# Patient Record
Sex: MALE | Race: WHITE | Employment: FULL TIME | URBAN - NONMETROPOLITAN AREA
[De-identification: names, ages, dates, MRNs, and addresses within clinical notes are randomized per-mention and may not be internally consistent; named-entity substitution may affect disease eponyms.]

---

## 2018-04-07 ENCOUNTER — HOSPITAL ENCOUNTER (EMERGENCY)
Age: 43
Discharge: HOME | End: 2018-04-07
Payer: COMMERCIAL

## 2018-04-07 VITALS
RESPIRATION RATE: 16 BRPM | SYSTOLIC BLOOD PRESSURE: 120 MMHG | WEIGHT: 205 LBS | DIASTOLIC BLOOD PRESSURE: 79 MMHG | HEART RATE: 66 BPM | HEIGHT: 74 IN | BODY MASS INDEX: 26.31 KG/M2 | OXYGEN SATURATION: 97 % | TEMPERATURE: 97.6 F

## 2018-04-07 DIAGNOSIS — J02.9 VIRAL PHARYNGITIS: Primary | ICD-10-CM

## 2018-04-07 LAB
GROUP A STREP CULTURE, REFLEX: NEGATIVE
REFLEX THROAT C + S: NORMAL

## 2018-04-07 PROCEDURE — 87070 CULTURE OTHR SPECIMN AEROBIC: CPT

## 2018-04-07 PROCEDURE — 99213 OFFICE O/P EST LOW 20 MIN: CPT | Performed by: NURSE PRACTITIONER

## 2018-04-07 PROCEDURE — 99214 OFFICE O/P EST MOD 30 MIN: CPT

## 2018-04-07 RX ORDER — IBUPROFEN 400 MG/1
400 TABLET ORAL EVERY 6 HOURS PRN
COMMUNITY
End: 2021-12-09

## 2018-04-07 ASSESSMENT — ENCOUNTER SYMPTOMS
COUGH: 1
DIARRHEA: 0
SHORTNESS OF BREATH: 0
NAUSEA: 0
SINUS PRESSURE: 1
CHEST TIGHTNESS: 0
SORE THROAT: 1
VOMITING: 0
SINUS PAIN: 1
RHINORRHEA: 1

## 2018-04-07 ASSESSMENT — PAIN DESCRIPTION - PAIN TYPE: TYPE: ACUTE PAIN

## 2018-04-07 ASSESSMENT — PAIN DESCRIPTION - FREQUENCY: FREQUENCY: CONTINUOUS

## 2018-04-07 ASSESSMENT — PAIN DESCRIPTION - DESCRIPTORS: DESCRIPTORS: ACHING;BURNING

## 2018-04-07 ASSESSMENT — PAIN DESCRIPTION - LOCATION: LOCATION: THROAT

## 2018-04-07 ASSESSMENT — PAIN SCALES - GENERAL: PAINLEVEL_OUTOF10: 3

## 2018-04-09 LAB — THROAT/NOSE CULTURE: NORMAL

## 2018-12-05 ENCOUNTER — APPOINTMENT (OUTPATIENT)
Dept: ULTRASOUND IMAGING | Age: 43
End: 2018-12-05
Payer: COMMERCIAL

## 2018-12-05 ENCOUNTER — HOSPITAL ENCOUNTER (EMERGENCY)
Age: 43
Discharge: HOME | End: 2018-12-05
Attending: EMERGENCY MEDICINE
Payer: COMMERCIAL

## 2018-12-05 VITALS
RESPIRATION RATE: 16 BRPM | BODY MASS INDEX: 26.31 KG/M2 | TEMPERATURE: 97.9 F | HEART RATE: 70 BPM | DIASTOLIC BLOOD PRESSURE: 87 MMHG | OXYGEN SATURATION: 98 % | WEIGHT: 205 LBS | SYSTOLIC BLOOD PRESSURE: 126 MMHG | HEIGHT: 74 IN

## 2018-12-05 DIAGNOSIS — R10.10 UPPER ABDOMINAL PAIN: ICD-10-CM

## 2018-12-05 DIAGNOSIS — R19.7 NAUSEA VOMITING AND DIARRHEA: Primary | ICD-10-CM

## 2018-12-05 DIAGNOSIS — K52.9 GASTROENTERITIS: ICD-10-CM

## 2018-12-05 DIAGNOSIS — R11.2 NAUSEA VOMITING AND DIARRHEA: Primary | ICD-10-CM

## 2018-12-05 LAB
ALBUMIN SERPL-MCNC: 4.1 G/DL (ref 3.5–5.1)
ALP BLD-CCNC: 73 U/L (ref 38–126)
ALT SERPL-CCNC: 33 U/L (ref 11–66)
AMYLASE: 49 U/L (ref 20–104)
ANION GAP SERPL CALCULATED.3IONS-SCNC: 9 MEQ/L (ref 8–16)
AST SERPL-CCNC: 28 U/L (ref 5–40)
BASOPHILS # BLD: 0.2 %
BASOPHILS ABSOLUTE: 0 THOU/MM3 (ref 0–0.1)
BILIRUB SERPL-MCNC: 0.4 MG/DL (ref 0.3–1.2)
BILIRUBIN DIRECT: < 0.2 MG/DL (ref 0–0.3)
BUN BLDV-MCNC: 17 MG/DL (ref 7–22)
CALCIUM SERPL-MCNC: 8.6 MG/DL (ref 8.5–10.5)
CHLORIDE BLD-SCNC: 98 MEQ/L (ref 98–111)
CO2: 27 MEQ/L (ref 23–33)
CREAT SERPL-MCNC: 0.8 MG/DL (ref 0.4–1.2)
EOSINOPHIL # BLD: 0.9 %
EOSINOPHILS ABSOLUTE: 0 THOU/MM3 (ref 0–0.4)
ERYTHROCYTE [DISTWIDTH] IN BLOOD BY AUTOMATED COUNT: 13 % (ref 11.5–14.5)
ERYTHROCYTE [DISTWIDTH] IN BLOOD BY AUTOMATED COUNT: 38.9 FL (ref 35–45)
GFR SERPL CREATININE-BSD FRML MDRD: > 90 ML/MIN/1.73M2
GLUCOSE BLD-MCNC: 95 MG/DL (ref 70–108)
HCT VFR BLD CALC: 43.2 % (ref 42–52)
HEMOGLOBIN: 14.5 GM/DL (ref 14–18)
IMMATURE GRANS (ABS): 0.01 THOU/MM3 (ref 0–0.07)
IMMATURE GRANULOCYTES: 0.2 %
LIPASE: 40.1 U/L (ref 5.6–51.3)
LYMPHOCYTES # BLD: 16.2 %
LYMPHOCYTES ABSOLUTE: 0.7 THOU/MM3 (ref 1–4.8)
MCH RBC QN AUTO: 27.9 PG (ref 26–33)
MCHC RBC AUTO-ENTMCNC: 33.6 GM/DL (ref 32.2–35.5)
MCV RBC AUTO: 83.1 FL (ref 80–94)
MONOCYTES # BLD: 11.9 %
MONOCYTES ABSOLUTE: 0.5 THOU/MM3 (ref 0.4–1.3)
NUCLEATED RED BLOOD CELLS: 0 /100 WBC
OSMOLALITY CALCULATION: 269.6 MOSMOL/KG (ref 275–300)
PLATELET # BLD: 141 THOU/MM3 (ref 130–400)
PMV BLD AUTO: 10.4 FL (ref 9.4–12.4)
POTASSIUM SERPL-SCNC: 3.7 MEQ/L (ref 3.5–5.2)
RBC # BLD: 5.2 MILL/MM3 (ref 4.7–6.1)
SEG NEUTROPHILS: 70.6 %
SEGMENTED NEUTROPHILS ABSOLUTE COUNT: 3.2 THOU/MM3 (ref 1.8–7.7)
SODIUM BLD-SCNC: 134 MEQ/L (ref 135–145)
TOTAL PROTEIN: 6.5 G/DL (ref 6.1–8)
WBC # BLD: 4.6 THOU/MM3 (ref 4.8–10.8)

## 2018-12-05 PROCEDURE — 82248 BILIRUBIN DIRECT: CPT

## 2018-12-05 PROCEDURE — 2580000003 HC RX 258: Performed by: EMERGENCY MEDICINE

## 2018-12-05 PROCEDURE — 80053 COMPREHEN METABOLIC PANEL: CPT

## 2018-12-05 PROCEDURE — 6360000002 HC RX W HCPCS: Performed by: EMERGENCY MEDICINE

## 2018-12-05 PROCEDURE — 96374 THER/PROPH/DIAG INJ IV PUSH: CPT

## 2018-12-05 PROCEDURE — 85025 COMPLETE CBC W/AUTO DIFF WBC: CPT

## 2018-12-05 PROCEDURE — 36415 COLL VENOUS BLD VENIPUNCTURE: CPT

## 2018-12-05 PROCEDURE — 76705 ECHO EXAM OF ABDOMEN: CPT

## 2018-12-05 PROCEDURE — 82150 ASSAY OF AMYLASE: CPT

## 2018-12-05 PROCEDURE — 83690 ASSAY OF LIPASE: CPT

## 2018-12-05 PROCEDURE — 99284 EMERGENCY DEPT VISIT MOD MDM: CPT

## 2018-12-05 RX ORDER — ONDANSETRON 4 MG/1
TABLET, FILM COATED ORAL
Qty: 20 TABLET | Refills: 0 | Status: SHIPPED | OUTPATIENT
Start: 2018-12-05 | End: 2021-12-09

## 2018-12-05 RX ORDER — METOCLOPRAMIDE HYDROCHLORIDE 5 MG/ML
10 INJECTION INTRAMUSCULAR; INTRAVENOUS ONCE
Status: COMPLETED | OUTPATIENT
Start: 2018-12-05 | End: 2018-12-05

## 2018-12-05 RX ORDER — SODIUM CHLORIDE 9 MG/ML
INJECTION, SOLUTION INTRAVENOUS CONTINUOUS
Status: DISCONTINUED | OUTPATIENT
Start: 2018-12-05 | End: 2018-12-05 | Stop reason: HOSPADM

## 2018-12-05 RX ORDER — DICYCLOMINE HCL 20 MG
20 TABLET ORAL 4 TIMES DAILY PRN
Qty: 20 TABLET | Refills: 0 | Status: ON HOLD | OUTPATIENT
Start: 2018-12-05 | End: 2019-09-05 | Stop reason: ALTCHOICE

## 2018-12-05 RX ORDER — 0.9 % SODIUM CHLORIDE 0.9 %
500 INTRAVENOUS SOLUTION INTRAVENOUS ONCE
Status: COMPLETED | OUTPATIENT
Start: 2018-12-05 | End: 2018-12-05

## 2018-12-05 RX ADMIN — SODIUM CHLORIDE 500 ML: 9 INJECTION, SOLUTION INTRAVENOUS at 12:39

## 2018-12-05 RX ADMIN — METOCLOPRAMIDE 10 MG: 5 INJECTION, SOLUTION INTRAMUSCULAR; INTRAVENOUS at 13:18

## 2018-12-05 RX ADMIN — SODIUM CHLORIDE: 9 INJECTION, SOLUTION INTRAVENOUS at 13:18

## 2018-12-05 ASSESSMENT — PAIN SCALES - GENERAL: PAINLEVEL_OUTOF10: 2

## 2018-12-05 ASSESSMENT — PAIN DESCRIPTION - LOCATION: LOCATION: ABDOMEN

## 2018-12-05 ASSESSMENT — ENCOUNTER SYMPTOMS
SHORTNESS OF BREATH: 0
BACK PAIN: 0
CONSTIPATION: 0
BLOOD IN STOOL: 0
VOMITING: 1
NAUSEA: 1
DIARRHEA: 1
WHEEZING: 0
RHINORRHEA: 0
SORE THROAT: 0
COUGH: 0
ABDOMINAL PAIN: 1

## 2018-12-05 ASSESSMENT — PAIN DESCRIPTION - PROGRESSION: CLINICAL_PROGRESSION: GRADUALLY IMPROVING

## 2018-12-05 ASSESSMENT — PAIN DESCRIPTION - ORIENTATION: ORIENTATION: MID;UPPER

## 2018-12-05 ASSESSMENT — PAIN DESCRIPTION - DESCRIPTORS: DESCRIPTORS: STABBING

## 2018-12-05 ASSESSMENT — PAIN DESCRIPTION - PAIN TYPE: TYPE: ACUTE PAIN

## 2018-12-05 ASSESSMENT — PAIN DESCRIPTION - FREQUENCY: FREQUENCY: INTERMITTENT

## 2018-12-05 ASSESSMENT — PAIN DESCRIPTION - ONSET: ONSET: SUDDEN

## 2019-04-11 NOTE — PROGRESS NOTES
NPO after midnight  [employer] and drivers license  Wear comfortable clean clothing  Do not bring jewelry   Shower night before and morning of surgery with a liquid antibacterial soap  Bring medications in original bottles  Follow all instructions given by your physician   needed at discharge  Call [phone] for any questions

## 2019-04-18 ENCOUNTER — ANESTHESIA (OUTPATIENT)
Dept: OPERATING ROOM | Age: 44
End: 2019-04-18
Payer: COMMERCIAL

## 2019-04-18 ENCOUNTER — HOSPITAL ENCOUNTER (OUTPATIENT)
Age: 44
Setting detail: OUTPATIENT SURGERY
Discharge: HOME | End: 2019-04-18
Attending: ORTHOPAEDIC SURGERY | Admitting: ORTHOPAEDIC SURGERY
Payer: COMMERCIAL

## 2019-04-18 ENCOUNTER — ANESTHESIA EVENT (OUTPATIENT)
Dept: OPERATING ROOM | Age: 44
End: 2019-04-18
Payer: COMMERCIAL

## 2019-04-18 ENCOUNTER — APPOINTMENT (OUTPATIENT)
Dept: GENERAL RADIOLOGY | Age: 44
End: 2019-04-18
Attending: ORTHOPAEDIC SURGERY
Payer: COMMERCIAL

## 2019-04-18 VITALS
RESPIRATION RATE: 13 BRPM | DIASTOLIC BLOOD PRESSURE: 61 MMHG | SYSTOLIC BLOOD PRESSURE: 113 MMHG | OXYGEN SATURATION: 98 %

## 2019-04-18 VITALS
WEIGHT: 210.2 LBS | RESPIRATION RATE: 16 BRPM | DIASTOLIC BLOOD PRESSURE: 76 MMHG | OXYGEN SATURATION: 99 % | HEART RATE: 59 BPM | SYSTOLIC BLOOD PRESSURE: 110 MMHG | BODY MASS INDEX: 26.98 KG/M2 | HEIGHT: 74 IN | TEMPERATURE: 97.4 F

## 2019-04-18 DIAGNOSIS — T84.84XA PAINFUL ORTHOPAEDIC HARDWARE (HCC): ICD-10-CM

## 2019-04-18 DIAGNOSIS — Z98.890 S/P HARDWARE REMOVAL: Primary | ICD-10-CM

## 2019-04-18 PROCEDURE — 7100000000 HC PACU RECOVERY - FIRST 15 MIN: Performed by: ORTHOPAEDIC SURGERY

## 2019-04-18 PROCEDURE — 2500000003 HC RX 250 WO HCPCS: Performed by: NURSE ANESTHETIST, CERTIFIED REGISTERED

## 2019-04-18 PROCEDURE — 7100000010 HC PHASE II RECOVERY - FIRST 15 MIN: Performed by: ORTHOPAEDIC SURGERY

## 2019-04-18 PROCEDURE — 3700000000 HC ANESTHESIA ATTENDED CARE: Performed by: ORTHOPAEDIC SURGERY

## 2019-04-18 PROCEDURE — 7100000001 HC PACU RECOVERY - ADDTL 15 MIN: Performed by: ORTHOPAEDIC SURGERY

## 2019-04-18 PROCEDURE — 3209999900 FLUORO FOR SURGICAL PROCEDURES

## 2019-04-18 PROCEDURE — 3700000001 HC ADD 15 MINUTES (ANESTHESIA): Performed by: ORTHOPAEDIC SURGERY

## 2019-04-18 PROCEDURE — 6360000002 HC RX W HCPCS: Performed by: NURSE ANESTHETIST, CERTIFIED REGISTERED

## 2019-04-18 PROCEDURE — 3600000013 HC SURGERY LEVEL 3 ADDTL 15MIN: Performed by: ORTHOPAEDIC SURGERY

## 2019-04-18 PROCEDURE — 2580000003 HC RX 258: Performed by: ORTHOPAEDIC SURGERY

## 2019-04-18 PROCEDURE — 73590 X-RAY EXAM OF LOWER LEG: CPT

## 2019-04-18 PROCEDURE — 3600000003 HC SURGERY LEVEL 3 BASE: Performed by: ORTHOPAEDIC SURGERY

## 2019-04-18 PROCEDURE — 2709999900 HC NON-CHARGEABLE SUPPLY: Performed by: ORTHOPAEDIC SURGERY

## 2019-04-18 PROCEDURE — 7100000011 HC PHASE II RECOVERY - ADDTL 15 MIN: Performed by: ORTHOPAEDIC SURGERY

## 2019-04-18 PROCEDURE — 2500000003 HC RX 250 WO HCPCS: Performed by: ORTHOPAEDIC SURGERY

## 2019-04-18 PROCEDURE — 2580000003 HC RX 258: Performed by: NURSE ANESTHETIST, CERTIFIED REGISTERED

## 2019-04-18 PROCEDURE — 6360000002 HC RX W HCPCS: Performed by: ORTHOPAEDIC SURGERY

## 2019-04-18 RX ORDER — FENTANYL CITRATE 50 UG/ML
INJECTION, SOLUTION INTRAMUSCULAR; INTRAVENOUS PRN
Status: DISCONTINUED | OUTPATIENT
Start: 2019-04-18 | End: 2019-04-18 | Stop reason: SDUPTHER

## 2019-04-18 RX ORDER — HYDROCODONE BITARTRATE AND ACETAMINOPHEN 5; 325 MG/1; MG/1
2 TABLET ORAL EVERY 4 HOURS PRN
Status: DISCONTINUED | OUTPATIENT
Start: 2019-04-18 | End: 2019-04-18 | Stop reason: HOSPADM

## 2019-04-18 RX ORDER — SODIUM CHLORIDE 0.9 % (FLUSH) 0.9 %
10 SYRINGE (ML) INJECTION PRN
Status: DISCONTINUED | OUTPATIENT
Start: 2019-04-18 | End: 2019-04-18 | Stop reason: HOSPADM

## 2019-04-18 RX ORDER — HYDROCODONE BITARTRATE AND ACETAMINOPHEN 5; 325 MG/1; MG/1
1 TABLET ORAL EVERY 4 HOURS PRN
Status: DISCONTINUED | OUTPATIENT
Start: 2019-04-18 | End: 2019-04-18 | Stop reason: HOSPADM

## 2019-04-18 RX ORDER — PROMETHAZINE HYDROCHLORIDE 25 MG/ML
6.25 INJECTION, SOLUTION INTRAMUSCULAR; INTRAVENOUS
Status: DISCONTINUED | OUTPATIENT
Start: 2019-04-18 | End: 2019-04-18 | Stop reason: HOSPADM

## 2019-04-18 RX ORDER — ONDANSETRON 2 MG/ML
4 INJECTION INTRAMUSCULAR; INTRAVENOUS
Status: DISCONTINUED | OUTPATIENT
Start: 2019-04-18 | End: 2019-04-18 | Stop reason: HOSPADM

## 2019-04-18 RX ORDER — BUPIVACAINE HYDROCHLORIDE AND EPINEPHRINE 2.5; 5 MG/ML; UG/ML
INJECTION, SOLUTION EPIDURAL; INFILTRATION; INTRACAUDAL; PERINEURAL PRN
Status: DISCONTINUED | OUTPATIENT
Start: 2019-04-18 | End: 2019-04-18 | Stop reason: ALTCHOICE

## 2019-04-18 RX ORDER — SODIUM CHLORIDE 9 MG/ML
INJECTION, SOLUTION INTRAVENOUS CONTINUOUS PRN
Status: DISCONTINUED | OUTPATIENT
Start: 2019-04-18 | End: 2019-04-18 | Stop reason: SDUPTHER

## 2019-04-18 RX ORDER — PROPOFOL 10 MG/ML
INJECTION, EMULSION INTRAVENOUS PRN
Status: DISCONTINUED | OUTPATIENT
Start: 2019-04-18 | End: 2019-04-18 | Stop reason: SDUPTHER

## 2019-04-18 RX ORDER — ONDANSETRON 2 MG/ML
INJECTION INTRAMUSCULAR; INTRAVENOUS PRN
Status: DISCONTINUED | OUTPATIENT
Start: 2019-04-18 | End: 2019-04-18 | Stop reason: SDUPTHER

## 2019-04-18 RX ORDER — FENTANYL CITRATE 50 UG/ML
50 INJECTION, SOLUTION INTRAMUSCULAR; INTRAVENOUS EVERY 5 MIN PRN
Status: DISCONTINUED | OUTPATIENT
Start: 2019-04-18 | End: 2019-04-18 | Stop reason: HOSPADM

## 2019-04-18 RX ORDER — SODIUM CHLORIDE 0.9 % (FLUSH) 0.9 %
10 SYRINGE (ML) INJECTION EVERY 12 HOURS SCHEDULED
Status: DISCONTINUED | OUTPATIENT
Start: 2019-04-18 | End: 2019-04-18 | Stop reason: HOSPADM

## 2019-04-18 RX ORDER — DEXAMETHASONE SODIUM PHOSPHATE 4 MG/ML
INJECTION, SOLUTION INTRA-ARTICULAR; INTRALESIONAL; INTRAMUSCULAR; INTRAVENOUS; SOFT TISSUE PRN
Status: DISCONTINUED | OUTPATIENT
Start: 2019-04-18 | End: 2019-04-18 | Stop reason: SDUPTHER

## 2019-04-18 RX ORDER — SODIUM CHLORIDE 9 MG/ML
INJECTION, SOLUTION INTRAVENOUS CONTINUOUS
Status: DISCONTINUED | OUTPATIENT
Start: 2019-04-18 | End: 2019-04-18 | Stop reason: HOSPADM

## 2019-04-18 RX ORDER — GLYCOPYRROLATE 1 MG/5 ML
SYRINGE (ML) INTRAVENOUS PRN
Status: DISCONTINUED | OUTPATIENT
Start: 2019-04-18 | End: 2019-04-18 | Stop reason: SDUPTHER

## 2019-04-18 RX ORDER — LABETALOL HYDROCHLORIDE 5 MG/ML
5 INJECTION, SOLUTION INTRAVENOUS EVERY 10 MIN PRN
Status: DISCONTINUED | OUTPATIENT
Start: 2019-04-18 | End: 2019-04-18 | Stop reason: HOSPADM

## 2019-04-18 RX ORDER — MORPHINE SULFATE 2 MG/ML
2 INJECTION, SOLUTION INTRAMUSCULAR; INTRAVENOUS
Status: DISCONTINUED | OUTPATIENT
Start: 2019-04-18 | End: 2019-04-18 | Stop reason: HOSPADM

## 2019-04-18 RX ORDER — MEPERIDINE HYDROCHLORIDE 25 MG/ML
12.5 INJECTION INTRAMUSCULAR; INTRAVENOUS; SUBCUTANEOUS EVERY 5 MIN PRN
Status: DISCONTINUED | OUTPATIENT
Start: 2019-04-18 | End: 2019-04-18 | Stop reason: HOSPADM

## 2019-04-18 RX ORDER — MORPHINE SULFATE 2 MG/ML
4 INJECTION, SOLUTION INTRAMUSCULAR; INTRAVENOUS
Status: DISCONTINUED | OUTPATIENT
Start: 2019-04-18 | End: 2019-04-18 | Stop reason: HOSPADM

## 2019-04-18 RX ORDER — LIDOCAINE HYDROCHLORIDE 20 MG/ML
INJECTION, SOLUTION INTRAVENOUS PRN
Status: DISCONTINUED | OUTPATIENT
Start: 2019-04-18 | End: 2019-04-18 | Stop reason: SDUPTHER

## 2019-04-18 RX ORDER — KETOROLAC TROMETHAMINE 30 MG/ML
INJECTION, SOLUTION INTRAMUSCULAR; INTRAVENOUS PRN
Status: DISCONTINUED | OUTPATIENT
Start: 2019-04-18 | End: 2019-04-18 | Stop reason: SDUPTHER

## 2019-04-18 RX ORDER — ONDANSETRON 2 MG/ML
4 INJECTION INTRAMUSCULAR; INTRAVENOUS EVERY 6 HOURS PRN
Status: DISCONTINUED | OUTPATIENT
Start: 2019-04-18 | End: 2019-04-18 | Stop reason: HOSPADM

## 2019-04-18 RX ORDER — MIDAZOLAM HYDROCHLORIDE 1 MG/ML
INJECTION INTRAMUSCULAR; INTRAVENOUS PRN
Status: DISCONTINUED | OUTPATIENT
Start: 2019-04-18 | End: 2019-04-18 | Stop reason: SDUPTHER

## 2019-04-18 RX ORDER — FENTANYL CITRATE 50 UG/ML
25 INJECTION, SOLUTION INTRAMUSCULAR; INTRAVENOUS EVERY 5 MIN PRN
Status: DISCONTINUED | OUTPATIENT
Start: 2019-04-18 | End: 2019-04-18 | Stop reason: HOSPADM

## 2019-04-18 RX ORDER — HYDROCODONE BITARTRATE AND ACETAMINOPHEN 5; 325 MG/1; MG/1
1-2 TABLET ORAL
Qty: 30 TABLET | Refills: 0 | Status: SHIPPED | OUTPATIENT
Start: 2019-04-18 | End: 2019-04-25

## 2019-04-18 RX ADMIN — PROPOFOL 250 MG: 10 INJECTION, EMULSION INTRAVENOUS at 07:54

## 2019-04-18 RX ADMIN — Medication 2 G: at 07:50

## 2019-04-18 RX ADMIN — FENTANYL CITRATE 100 MCG: 50 INJECTION INTRAMUSCULAR; INTRAVENOUS at 07:54

## 2019-04-18 RX ADMIN — FENTANYL CITRATE 50 MCG: 50 INJECTION INTRAMUSCULAR; INTRAVENOUS at 08:15

## 2019-04-18 RX ADMIN — Medication 0.2 MG: at 07:54

## 2019-04-18 RX ADMIN — MIDAZOLAM HYDROCHLORIDE 2 MG: 1 INJECTION, SOLUTION INTRAMUSCULAR; INTRAVENOUS at 07:50

## 2019-04-18 RX ADMIN — KETOROLAC TROMETHAMINE 30 MG: 30 INJECTION, SOLUTION INTRAMUSCULAR; INTRAVENOUS at 08:17

## 2019-04-18 RX ADMIN — DEXAMETHASONE SODIUM PHOSPHATE 10 MG: 4 INJECTION, SOLUTION INTRAMUSCULAR; INTRAVENOUS at 07:54

## 2019-04-18 RX ADMIN — LIDOCAINE HYDROCHLORIDE 100 MG: 20 INJECTION, SOLUTION INTRAVENOUS at 07:54

## 2019-04-18 RX ADMIN — SODIUM CHLORIDE: 9 INJECTION, SOLUTION INTRAVENOUS at 06:50

## 2019-04-18 RX ADMIN — SODIUM CHLORIDE: 9 INJECTION, SOLUTION INTRAVENOUS at 08:21

## 2019-04-18 RX ADMIN — ONDANSETRON HYDROCHLORIDE 4 MG: 4 INJECTION, SOLUTION INTRAMUSCULAR; INTRAVENOUS at 07:54

## 2019-04-18 RX ADMIN — SODIUM CHLORIDE: 9 INJECTION, SOLUTION INTRAVENOUS at 07:50

## 2019-04-18 ASSESSMENT — PULMONARY FUNCTION TESTS
PIF_VALUE: 15
PIF_VALUE: 22
PIF_VALUE: 15
PIF_VALUE: 17
PIF_VALUE: 15
PIF_VALUE: 13
PIF_VALUE: 15
PIF_VALUE: 3
PIF_VALUE: 15
PIF_VALUE: 6
PIF_VALUE: 15
PIF_VALUE: 13
PIF_VALUE: 23
PIF_VALUE: 15
PIF_VALUE: 1
PIF_VALUE: 15
PIF_VALUE: 4
PIF_VALUE: 1
PIF_VALUE: 3
PIF_VALUE: 16
PIF_VALUE: 15
PIF_VALUE: 15
PIF_VALUE: 13
PIF_VALUE: 15
PIF_VALUE: 3
PIF_VALUE: 15
PIF_VALUE: 13
PIF_VALUE: 15
PIF_VALUE: 3
PIF_VALUE: 18
PIF_VALUE: 15
PIF_VALUE: 17

## 2019-04-18 ASSESSMENT — PAIN DESCRIPTION - PAIN TYPE
TYPE: SURGICAL PAIN
TYPE: SURGICAL PAIN

## 2019-04-18 ASSESSMENT — PAIN SCALES - GENERAL
PAINLEVEL_OUTOF10: 2
PAINLEVEL_OUTOF10: 0
PAINLEVEL_OUTOF10: 1

## 2019-04-18 ASSESSMENT — PAIN - FUNCTIONAL ASSESSMENT: PAIN_FUNCTIONAL_ASSESSMENT: 0-10

## 2019-04-18 ASSESSMENT — PAIN DESCRIPTION - LOCATION
LOCATION: LEG
LOCATION: LEG

## 2019-04-18 ASSESSMENT — PAIN DESCRIPTION - ORIENTATION
ORIENTATION: RIGHT
ORIENTATION: RIGHT

## 2019-04-18 ASSESSMENT — PAIN DESCRIPTION - DESCRIPTORS: DESCRIPTORS: DISCOMFORT

## 2019-04-18 NOTE — ANESTHESIA POSTPROCEDURE EVALUATION
Department of Anesthesiology  Postprocedure Note    Patient: Tony Dubois  MRN: [id number]  YOB: 1975  Date of evaluation: 4/18/2019  Time:  10:31 AM     Procedure Summary     Date:  04/18/19 Room / Location:  [address] ALEXANDR Ramirez    Anesthesia Start:  0750 Anesthesia Stop:  Aliciasenait Yarbrough    Procedure:  HARDWARE REMOVAL RIGHT TIBIA ALL SCREWS (N/A ) Diagnosis:  (SYMPTOMATIC HARDWARE RIGHT TIBIA)    Surgeon:  Angela Church MD Responsible Provider:  Jaydon Barrera MD    Anesthesia Type:  general ASA Status:  1          Anesthesia Type: general    Amadeo Phase I: Amadeo Score: 10    Amadeo Phase II:      Last vitals: Reviewed and per EMR flowsheets.        Anesthesia Post Evaluation    Patient location during evaluation: PACU  Patient participation: complete - patient participated  Level of consciousness: awake and alert  Airway patency: patent  Nausea & Vomiting: no nausea  Complications: no  Cardiovascular status: blood pressure returned to baseline and hemodynamically stable  Respiratory status: acceptable and spontaneous ventilation  Hydration status: euvolemic

## 2019-04-18 NOTE — PROGRESS NOTES
Return from PACU, family at bedside. Eating and drinking without problems. Good neuro and circ checks.

## 2019-04-18 NOTE — PROGRESS NOTES
DISCHARGE INSTRUCTIONS REVIEWED WITH PATIENT AND FAMILY. REMAINS WITH GOOD NEURO AND CIRC CHECKS. DRESSING HAS SCANT AMOUNT OF BLOODY DRAINAGE. DISCHARGED BY WHEELCHAIR TO CAR.

## 2019-04-18 NOTE — H&P
History and Physical Update    Pt Name: Patric Crews  MRN: [id number]  YOB: 1975  Date of evaluation: 4/18/2019    [x] I have examined the patient and reviewed the H&P/Consult and there are no changes to the patient or plans.     [] I have examined the patient and reviewed the H&P/Consult and have noted the following changes:        KNE Costello CNP   Electronically signed 4/18/2019 at 6039

## 2019-04-18 NOTE — OP NOTE
PLAN:  Weightbearing as tolerated. First postop visit  will be in three weeks for suture removal, likely back to normal  activity. GENO Easton, assisted throughout the procedure with  positioning, draping, retraction, wound closure, dressing, and splint  application.               Jennifer Lundberg M.D.    D: 04/18/2019 9:20:04       T: 04/18/2019 13:51:21     [email or web address]  Job#: [id number]     Doc#: [id number]    CC:

## 2019-04-18 NOTE — ANESTHESIA PRE PROCEDURE
Department of Anesthesiology  Preprocedure Note       Name:  Tania Cadena   Age:  41 y.o.  :  1975                                          MRN:  [id number]         Date:  2019      Surgeon: Bessy Neumann):  Lottie Miller MD    Procedure: HARDWARE REMOVAL RIGHT TIBIA ALL SCREWS (N/A )    Medications prior to admission:   Prior to Admission medications    Medication Sig Start Date End Date Taking? Authorizing Provider   ibuprofen (ADVIL;MOTRIN) 400 MG tablet Take 400 mg by mouth every 6 hours as needed for Pain   Yes Historical Provider, MD   ondansetron (ZOFRAN) 4 MG tablet 1 tablet every 6-8 hours for nausea vomiting when necessary 18   Ren Rivas MD   dicyclomine (BENTYL) 20 MG tablet Take 1 tablet by mouth 4 times daily as needed (abdominal cramping pain) 18   Ren Rivas MD   mometasone (NASONEX) 50 MCG/ACT nasal spray 2 sprays by Nasal route daily for 10 days 9/23/16 10/3/16  Brianna Oleary DO   acetaminophen (TYLENOL) 500 MG tablet Take 1,000 mg by mouth every 6 hours as needed for Pain     Historical Provider, MD   Multiple Vitamin (MULTI VITAMIN MENS) TABS Take  by mouth.       Historical Provider, MD       Current medications:    Current Facility-Administered Medications   Medication Dose Route Frequency Provider Last Rate Last Dose    sodium chloride flush 0.9 % injection 10 mL  10 mL Intravenous 2 times per day Lottie Miller MD        sodium chloride flush 0.9 % injection 10 mL  10 mL Intravenous PRN Lottie Miller MD        0.9 % sodium chloride infusion   Intravenous Continuous Lottie Miller  mL/hr at 19 0650      ceFAZolin (ANCEF) 2 g in dextrose 5 % 50 mL IVPB  2 g Intravenous On Call to Shanice Jacobsen MD           Allergies:  No Known Allergies    Problem List:    Patient Active Problem List   Diagnosis Code    T12 compression fracture (HCC) S22.080A    Splenomegaly R16.1    Allergic rhinitis J30.9    Superficial phlebitis I80.9  Bloody diarrhea R19.7    Hematochezia K92.1       Past Medical History:        Diagnosis Date    Allergic rhinitis     CMV (cytomegalovirus infection) (HCC)     Prolonged emergence from general anesthesia     T12 compression fracture ([address]        Past Surgical History:        Procedure Laterality Date    ELBOW SURGERY      right    FRACTURE SURGERY  04/25/2016    right leg    TIBIA FRACTURE SURGERY      right leg    WISDOM TOOTH EXTRACTION         Social History:    Social History     Tobacco Use    Smoking status: Never Smoker    Smokeless tobacco: Never Used   Substance Use Topics    Alcohol use: No                                Counseling given: Not Answered      Vital Signs (Current):   Vitals:    04/11/19 0948 04/18/19 0607 04/18/19 0615   BP:  125/70    Pulse:  69    Resp:  16    Temp:  98.8 °F (37.1 °C)    TempSrc:  Temporal    SpO2:  98%    Weight: 205 lb (93 kg) 210 lb 3.2 oz (95.3 kg) 210 lb 3.2 oz (95.3 kg)   Height: 6' 2\" (1.88 m)  6' 2\" (1.88 m)                                              BP Readings from Last 3 Encounters:   04/18/19 125/70   12/05/18 126/87   04/07/18 120/79       NPO Status: Time of last liquid consumption: 1900                        Time of last solid consumption: 1900                        Date of last liquid consumption: 04/17/19                        Date of last solid food consumption: 04/17/19    BMI:   Wt Readings from Last 3 Encounters:   04/18/19 210 lb 3.2 oz (95.3 kg)   12/05/18 205 lb (93 kg)   04/07/18 205 lb (93 kg)     Body mass index is 26.99 kg/m².     CBC:   Lab Results   Component Value Date    WBC 5.0 03/25/2019    RBC 5.09 03/25/2019    RBC 4.90 10/16/2014    HGB 14.3 03/25/2019    HCT 42.4 03/25/2019    MCV 83.2 03/25/2019    RDW 13.6 03/25/2019     03/25/2019       CMP:   Lab Results   Component Value Date     03/25/2019    K 4.2 03/25/2019     03/25/2019    CO2 27 03/25/2019    BUN 21 03/25/2019    CREATININE 0.97 03/25/2019    AGRATIO 1.8 04/24/2016    LABGLOM >90 12/05/2018    GLUCOSE 101 03/25/2019    PROT 6.5 12/05/2018    CALCIUM 9.2 03/25/2019    BILITOT 0.4 12/05/2018    ALKPHOS 73 12/05/2018    AST 28 12/05/2018    ALT 33 12/05/2018       POC Tests: No results for input(s): POCGLU, POCNA, POCK, POCCL, POCBUN, POCHEMO, POCHCT in the last 72 hours. Coags:   Lab Results   Component Value Date    PROTIME 12.0 04/24/2016    INR 1.11 04/24/2016    APTT < 19.0 04/24/2016       HCG (If Applicable): No results found for: PREGTESTUR, PREGSERUM, HCG, HCGQUANT     ABGs: No results found for: PHART, PO2ART, VXB5FJQ, PLY1KBE, BEART, Z8FZOYOE     Type & Screen (If Applicable):  No results found for: LABABO, LABRH    Anesthesia Evaluation   no history of anesthetic complications:   Airway: Mallampati: II  TM distance: >3 FB   Neck ROM: full  Mouth opening: > = 3 FB Dental:          Pulmonary:Negative Pulmonary ROS and normal exam              Patient did not smoke on day of surgery. Cardiovascular:  Exercise tolerance: good (>4 METS),                     Neuro/Psych:   Negative Neuro/Psych ROS              GI/Hepatic/Renal: Neg GI/Hepatic/Renal ROS            Endo/Other: Negative Endo/Other ROS             Pt had no PAT visit       Abdominal:           Vascular: negative vascular ROS. Anesthesia Plan      general     ASA 1       Induction: intravenous. MIPS: Postoperative opioids intended and Prophylactic antiemetics administered. Anesthetic plan and risks discussed with patient. Plan discussed with CRNA.                   Wayne Orellana MD   4/18/2019

## 2019-04-18 NOTE — BRIEF OP NOTE
Brief Postoperative Note  ______________________________________________________________    Patient: Fareed Salomon  YOB: 1975  MRN: [id number]  Date of Procedure: 4/18/2019    Pre-Op Diagnosis: SYMPTOMATIC HARDWARE RIGHT TIBIA    Post-Op Diagnosis: Same       Procedure(s):  HARDWARE REMOVAL RIGHT TIBIA ALL SCREWS    Anesthesia: General    Surgeon(s):  Philemon Callas, MD    Staff:  Scrub Person First: Melvin Nguyen  Scrub Person Second: Volodymyr Robertson  Physician Assistant: Funmi Galan PA-C     Estimated Blood Loss: 10 (units unknown)    Complications: None    Specimens:   * No specimens in log *    Implants:  * No implants in log *      Drains: * No LDAs found *    Findings: confirmed     KEN Lincoln CNP  Date: 4/18/2019  Time: 8:47 AM

## 2019-08-25 ENCOUNTER — HOSPITAL ENCOUNTER (EMERGENCY)
Age: 44
Discharge: HOME | End: 2019-08-25
Payer: COMMERCIAL

## 2019-08-25 VITALS
SYSTOLIC BLOOD PRESSURE: 129 MMHG | DIASTOLIC BLOOD PRESSURE: 85 MMHG | TEMPERATURE: 98.3 F | HEIGHT: 74 IN | HEART RATE: 70 BPM | RESPIRATION RATE: 18 BRPM | WEIGHT: 210 LBS | OXYGEN SATURATION: 99 % | BODY MASS INDEX: 26.95 KG/M2

## 2019-08-25 DIAGNOSIS — L27.0 DRUG ERUPTION: Primary | ICD-10-CM

## 2019-08-25 PROCEDURE — 99282 EMERGENCY DEPT VISIT SF MDM: CPT

## 2019-08-25 RX ORDER — METHYLPREDNISOLONE 4 MG/1
TABLET ORAL
Qty: 1 KIT | Refills: 0 | Status: SHIPPED | OUTPATIENT
Start: 2019-08-25 | End: 2019-08-31

## 2019-08-25 ASSESSMENT — ENCOUNTER SYMPTOMS
NAUSEA: 0
CHEST TIGHTNESS: 0
COLOR CHANGE: 1
FACIAL SWELLING: 0
VOMITING: 0
ABDOMINAL DISTENTION: 0
BACK PAIN: 0
SHORTNESS OF BREATH: 0

## 2019-08-25 NOTE — ED PROVIDER NOTES
light-headedness and numbness. Hematological: Does not bruise/bleed easily. Psychiatric/Behavioral: Negative for agitation, behavioral problems and confusion. PAST MEDICAL HISTORY     Past Medical History:   Diagnosis Date    Allergic rhinitis     [facility] infection) [facility]     Prolonged emergence from general anesthesia     T12 compression fracture (HCC)        SURGICALHISTORY      has a past surgical history that includes Elbow surgery; [place] tooth extraction; fracture surgery (2016); Tibia fracture surgery; and REMOVE HARDWARE FEMUR (N/A, 2019). CURRENT MEDICATIONS       Previous Medications    DICYCLOMINE (BENTYL) 20 MG TABLET    Take 1 tablet by mouth 4 times daily as needed (abdominal cramping pain)    IBUPROFEN (ADVIL;MOTRIN) 400 MG TABLET    Take 400 mg by mouth every 6 hours as needed for Pain    MOMETASONE (NASONEX) 50 MCG/ACT NASAL SPRAY    2 sprays by Nasal route daily for 10 days    MULTIPLE VITAMIN (MULTI VITAMIN MENS) TABS    Take  by mouth. ONDANSETRON (ZOFRAN) 4 MG TABLET    1 tablet every 6-8 hours for nausea vomiting when necessary       ALLERGIES     has No Known Allergies. FAMILY HISTORY     He indicated that his mother is alive. He indicated that his father is . He indicated that his sister is . He indicated that his brother is alive. He indicated that the status of his other is unknown.   family history includes COPD in his father; Ky Flood in an other family member; High Blood Pressure in his mother; Prostate Cancer in an other family member.     SOCIAL HISTORY       Social History     Socioeconomic History    Marital status:      Spouse name: Rikki Sanchez Number of children: 0    Years of education: 15    Highest education level: Not on file   Occupational History    Occupation:      Employer: [employer]   Social Needs    Financial resource strain: Not on file    Food insecurity:     Worry: Not on file     Inability: Not on file    Transportation needs:     Medical: Not on file     Non-medical: Not on file   Tobacco Use    Smoking status: Never Smoker    Smokeless tobacco: Never Used   Substance and Sexual Activity    Alcohol use: No    Drug use: No    Sexual activity: Yes     Partners: Female   Lifestyle    Physical activity:     Days per week: Not on file     Minutes per session: Not on file    Stress: Not on file   Relationships    Social connections:     Talks on phone: Not on file     Gets together: Not on file     Attends Baptism service: Not on file     Active member of club or organization: Not on file     Attends meetings of clubs or organizations: Not on file     Relationship status: Not on file    Intimate partner violence:     Fear of current or ex partner: Not on file     Emotionally abused: Not on file     Physically abused: Not on file     Forced sexual activity: Not on file   Other Topics Concern    Not on file   Social History Narrative    Not on file       PHYSICAL EXAM     INITIAL VITALS:  height is 6' 2\" (1.88 m) and weight is 210 lb (95.3 kg). His oral temperature is 98.3 °F (36.8 °C). His blood pressure is 129/85 and his pulse is 70. His respiration is 18 and oxygen saturation is 99%. Physical Exam   Constitutional: He is oriented to person, place, and time. He appears well-developed and well-nourished. HENT:   Head: Normocephalic. Right Ear: External ear normal.   Left Ear: External ear normal.   Nose: Nose normal.   Mouth/Throat: Uvula is midline and oropharynx is clear and moist.   Eyes: Conjunctivae and EOM are normal.   Neck: Normal range of motion. Neck supple. Cardiovascular: Normal rate, regular rhythm, S1 normal, S2 normal, normal heart sounds and intact distal pulses. Pulmonary/Chest: Effort normal and breath sounds normal. No respiratory distress. He exhibits no tenderness. Abdominal: Soft.  Normal appearance and bowel sounds are normal. He

## 2019-09-04 ENCOUNTER — HOSPITAL ENCOUNTER (OUTPATIENT)
Age: 44
Discharge: HOME | End: 2019-09-04
Payer: COMMERCIAL

## 2019-09-04 LAB
ANION GAP SERPL CALCULATED.3IONS-SCNC: 13 MEQ/L (ref 8–16)
BUN BLDV-MCNC: 16 MG/DL (ref 7–22)
CALCIUM SERPL-MCNC: 10 MG/DL (ref 8.5–10.5)
CHLORIDE BLD-SCNC: 102 MEQ/L (ref 98–111)
CO2: 29 MEQ/L (ref 23–33)
CREAT SERPL-MCNC: 0.9 MG/DL (ref 0.4–1.2)
EKG ATRIAL RATE: 70 BPM
EKG P AXIS: 52 DEGREES
EKG P-R INTERVAL: 146 MS
EKG Q-T INTERVAL: 372 MS
EKG QRS DURATION: 94 MS
EKG QTC CALCULATION (BAZETT): 401 MS
EKG R AXIS: -24 DEGREES
EKG T AXIS: 19 DEGREES
EKG VENTRICULAR RATE: 70 BPM
ERYTHROCYTE [DISTWIDTH] IN BLOOD BY AUTOMATED COUNT: 13.2 % (ref 11.5–14.5)
ERYTHROCYTE [DISTWIDTH] IN BLOOD BY AUTOMATED COUNT: 41.1 FL (ref 35–45)
GFR SERPL CREATININE-BSD FRML MDRD: > 90 ML/MIN/1.73M2
GLUCOSE BLD-MCNC: 84 MG/DL (ref 70–108)
HCT VFR BLD CALC: 47.1 % (ref 42–52)
HEMOGLOBIN: 15.1 GM/DL (ref 14–18)
MCH RBC QN AUTO: 27.4 PG (ref 26–33)
MCHC RBC AUTO-ENTMCNC: 32.1 GM/DL (ref 32.2–35.5)
MCV RBC AUTO: 85.5 FL (ref 80–94)
PLATELET # BLD: 209 THOU/MM3 (ref 130–400)
PMV BLD AUTO: 10.2 FL (ref 9.4–12.4)
POTASSIUM SERPL-SCNC: 4.5 MEQ/L (ref 3.5–5.2)
RBC # BLD: 5.51 MILL/MM3 (ref 4.7–6.1)
SODIUM BLD-SCNC: 144 MEQ/L (ref 135–145)
WBC # BLD: 6 THOU/MM3 (ref 4.8–10.8)

## 2019-09-04 PROCEDURE — 93005 ELECTROCARDIOGRAM TRACING: CPT | Performed by: NURSE PRACTITIONER

## 2019-09-04 PROCEDURE — 85027 COMPLETE CBC AUTOMATED: CPT

## 2019-09-04 PROCEDURE — 80048 BASIC METABOLIC PNL TOTAL CA: CPT

## 2019-09-04 PROCEDURE — 36415 COLL VENOUS BLD VENIPUNCTURE: CPT

## 2019-09-05 ENCOUNTER — ANESTHESIA EVENT (OUTPATIENT)
Dept: OPERATING ROOM | Age: 44
End: 2019-09-05
Payer: COMMERCIAL

## 2019-09-05 ENCOUNTER — ANESTHESIA (OUTPATIENT)
Dept: OPERATING ROOM | Age: 44
End: 2019-09-05
Payer: COMMERCIAL

## 2019-09-05 ENCOUNTER — APPOINTMENT (OUTPATIENT)
Dept: GENERAL RADIOLOGY | Age: 44
End: 2019-09-05
Attending: ORTHOPAEDIC SURGERY
Payer: COMMERCIAL

## 2019-09-05 ENCOUNTER — HOSPITAL ENCOUNTER (OUTPATIENT)
Age: 44
Setting detail: OUTPATIENT SURGERY
Discharge: HOME | End: 2019-09-05
Attending: ORTHOPAEDIC SURGERY | Admitting: ORTHOPAEDIC SURGERY
Payer: COMMERCIAL

## 2019-09-05 VITALS
HEART RATE: 63 BPM | WEIGHT: 211.4 LBS | OXYGEN SATURATION: 98 % | SYSTOLIC BLOOD PRESSURE: 142 MMHG | RESPIRATION RATE: 16 BRPM | BODY MASS INDEX: 27.13 KG/M2 | DIASTOLIC BLOOD PRESSURE: 88 MMHG | HEIGHT: 74 IN | TEMPERATURE: 97.9 F

## 2019-09-05 VITALS
DIASTOLIC BLOOD PRESSURE: 57 MMHG | OXYGEN SATURATION: 99 % | RESPIRATION RATE: 8 BRPM | SYSTOLIC BLOOD PRESSURE: 103 MMHG

## 2019-09-05 DIAGNOSIS — T84.84XA PAINFUL ORTHOPAEDIC HARDWARE (HCC): ICD-10-CM

## 2019-09-05 DIAGNOSIS — Z98.890 H/O MAJOR ORTHOPEDIC SURGERY: Primary | ICD-10-CM

## 2019-09-05 PROCEDURE — 6360000002 HC RX W HCPCS: Performed by: ANESTHESIOLOGY

## 2019-09-05 PROCEDURE — 3600000003 HC SURGERY LEVEL 3 BASE: Performed by: ORTHOPAEDIC SURGERY

## 2019-09-05 PROCEDURE — 2709999900 HC NON-CHARGEABLE SUPPLY: Performed by: ORTHOPAEDIC SURGERY

## 2019-09-05 PROCEDURE — 2720000010 HC SURG SUPPLY STERILE: Performed by: ORTHOPAEDIC SURGERY

## 2019-09-05 PROCEDURE — 3209999900 FLUORO FOR SURGICAL PROCEDURES

## 2019-09-05 PROCEDURE — 2500000003 HC RX 250 WO HCPCS: Performed by: ORTHOPAEDIC SURGERY

## 2019-09-05 PROCEDURE — 6360000002 HC RX W HCPCS: Performed by: ORTHOPAEDIC SURGERY

## 2019-09-05 PROCEDURE — 3700000001 HC ADD 15 MINUTES (ANESTHESIA): Performed by: ORTHOPAEDIC SURGERY

## 2019-09-05 PROCEDURE — 7100000011 HC PHASE II RECOVERY - ADDTL 15 MIN: Performed by: ORTHOPAEDIC SURGERY

## 2019-09-05 PROCEDURE — 3700000000 HC ANESTHESIA ATTENDED CARE: Performed by: ORTHOPAEDIC SURGERY

## 2019-09-05 PROCEDURE — 93010 ELECTROCARDIOGRAM REPORT: CPT | Performed by: INTERNAL MEDICINE

## 2019-09-05 PROCEDURE — 7100000010 HC PHASE II RECOVERY - FIRST 15 MIN: Performed by: ORTHOPAEDIC SURGERY

## 2019-09-05 PROCEDURE — 2580000003 HC RX 258: Performed by: ORTHOPAEDIC SURGERY

## 2019-09-05 PROCEDURE — 7100000001 HC PACU RECOVERY - ADDTL 15 MIN: Performed by: ORTHOPAEDIC SURGERY

## 2019-09-05 PROCEDURE — 7100000000 HC PACU RECOVERY - FIRST 15 MIN: Performed by: ORTHOPAEDIC SURGERY

## 2019-09-05 PROCEDURE — 73590 X-RAY EXAM OF LOWER LEG: CPT

## 2019-09-05 PROCEDURE — 2580000003 HC RX 258: Performed by: ANESTHESIOLOGY

## 2019-09-05 PROCEDURE — 3600000013 HC SURGERY LEVEL 3 ADDTL 15MIN: Performed by: ORTHOPAEDIC SURGERY

## 2019-09-05 RX ORDER — HYDROCODONE BITARTRATE AND ACETAMINOPHEN 5; 325 MG/1; MG/1
1 TABLET ORAL EVERY 4 HOURS PRN
Status: DISCONTINUED | OUTPATIENT
Start: 2019-09-05 | End: 2019-09-05 | Stop reason: HOSPADM

## 2019-09-05 RX ORDER — ONDANSETRON 2 MG/ML
4 INJECTION INTRAMUSCULAR; INTRAVENOUS
Status: DISCONTINUED | OUTPATIENT
Start: 2019-09-05 | End: 2019-09-05 | Stop reason: HOSPADM

## 2019-09-05 RX ORDER — PROPOFOL 10 MG/ML
INJECTION, EMULSION INTRAVENOUS PRN
Status: DISCONTINUED | OUTPATIENT
Start: 2019-09-05 | End: 2019-09-05 | Stop reason: SDUPTHER

## 2019-09-05 RX ORDER — HYDROCODONE BITARTRATE AND ACETAMINOPHEN 5; 325 MG/1; MG/1
2 TABLET ORAL EVERY 4 HOURS PRN
Status: DISCONTINUED | OUTPATIENT
Start: 2019-09-05 | End: 2019-09-05 | Stop reason: HOSPADM

## 2019-09-05 RX ORDER — ONDANSETRON 2 MG/ML
INJECTION INTRAMUSCULAR; INTRAVENOUS PRN
Status: DISCONTINUED | OUTPATIENT
Start: 2019-09-05 | End: 2019-09-05 | Stop reason: SDUPTHER

## 2019-09-05 RX ORDER — MORPHINE SULFATE 2 MG/ML
2 INJECTION, SOLUTION INTRAMUSCULAR; INTRAVENOUS
Status: DISCONTINUED | OUTPATIENT
Start: 2019-09-05 | End: 2019-09-05 | Stop reason: HOSPADM

## 2019-09-05 RX ORDER — BUPIVACAINE HYDROCHLORIDE AND EPINEPHRINE 5; 5 MG/ML; UG/ML
INJECTION, SOLUTION EPIDURAL; INTRACAUDAL; PERINEURAL PRN
Status: DISCONTINUED | OUTPATIENT
Start: 2019-09-05 | End: 2019-09-05 | Stop reason: ALTCHOICE

## 2019-09-05 RX ORDER — HYDROCODONE BITARTRATE AND ACETAMINOPHEN 5; 325 MG/1; MG/1
1-2 TABLET ORAL
Qty: 40 TABLET | Refills: 0 | Status: SHIPPED | OUTPATIENT
Start: 2019-09-05 | End: 2019-09-12

## 2019-09-05 RX ORDER — MIDAZOLAM HYDROCHLORIDE 1 MG/ML
INJECTION INTRAMUSCULAR; INTRAVENOUS PRN
Status: DISCONTINUED | OUTPATIENT
Start: 2019-09-05 | End: 2019-09-05 | Stop reason: SDUPTHER

## 2019-09-05 RX ORDER — FENTANYL CITRATE 50 UG/ML
INJECTION, SOLUTION INTRAMUSCULAR; INTRAVENOUS PRN
Status: DISCONTINUED | OUTPATIENT
Start: 2019-09-05 | End: 2019-09-05 | Stop reason: SDUPTHER

## 2019-09-05 RX ORDER — SODIUM CHLORIDE 0.9 % (FLUSH) 0.9 %
10 SYRINGE (ML) INJECTION EVERY 12 HOURS SCHEDULED
Status: DISCONTINUED | OUTPATIENT
Start: 2019-09-05 | End: 2019-09-05 | Stop reason: HOSPADM

## 2019-09-05 RX ORDER — MORPHINE SULFATE 2 MG/ML
4 INJECTION, SOLUTION INTRAMUSCULAR; INTRAVENOUS
Status: DISCONTINUED | OUTPATIENT
Start: 2019-09-05 | End: 2019-09-05 | Stop reason: HOSPADM

## 2019-09-05 RX ORDER — MEPERIDINE HYDROCHLORIDE 25 MG/ML
12.5 INJECTION INTRAMUSCULAR; INTRAVENOUS; SUBCUTANEOUS EVERY 5 MIN PRN
Status: DISCONTINUED | OUTPATIENT
Start: 2019-09-05 | End: 2019-09-05 | Stop reason: HOSPADM

## 2019-09-05 RX ORDER — DEXAMETHASONE SODIUM PHOSPHATE 4 MG/ML
INJECTION, SOLUTION INTRA-ARTICULAR; INTRALESIONAL; INTRAMUSCULAR; INTRAVENOUS; SOFT TISSUE PRN
Status: DISCONTINUED | OUTPATIENT
Start: 2019-09-05 | End: 2019-09-05 | Stop reason: SDUPTHER

## 2019-09-05 RX ORDER — SODIUM CHLORIDE 0.9 % (FLUSH) 0.9 %
10 SYRINGE (ML) INJECTION PRN
Status: DISCONTINUED | OUTPATIENT
Start: 2019-09-05 | End: 2019-09-05 | Stop reason: HOSPADM

## 2019-09-05 RX ORDER — FENTANYL CITRATE 50 UG/ML
50 INJECTION, SOLUTION INTRAMUSCULAR; INTRAVENOUS EVERY 5 MIN PRN
Status: DISCONTINUED | OUTPATIENT
Start: 2019-09-05 | End: 2019-09-05 | Stop reason: HOSPADM

## 2019-09-05 RX ORDER — CYCLOBENZAPRINE HCL 10 MG
10 TABLET ORAL 3 TIMES DAILY PRN
Qty: 20 TABLET | Refills: 0 | Status: SHIPPED | OUTPATIENT
Start: 2019-09-05 | End: 2019-09-15

## 2019-09-05 RX ORDER — SODIUM CHLORIDE 9 MG/ML
INJECTION, SOLUTION INTRAVENOUS CONTINUOUS
Status: DISCONTINUED | OUTPATIENT
Start: 2019-09-05 | End: 2019-09-05 | Stop reason: HOSPADM

## 2019-09-05 RX ORDER — ONDANSETRON 2 MG/ML
4 INJECTION INTRAMUSCULAR; INTRAVENOUS EVERY 6 HOURS PRN
Status: DISCONTINUED | OUTPATIENT
Start: 2019-09-05 | End: 2019-09-05 | Stop reason: HOSPADM

## 2019-09-05 RX ORDER — SODIUM CHLORIDE 9 MG/ML
INJECTION, SOLUTION INTRAVENOUS CONTINUOUS PRN
Status: DISCONTINUED | OUTPATIENT
Start: 2019-09-05 | End: 2019-09-05 | Stop reason: SDUPTHER

## 2019-09-05 RX ORDER — LABETALOL 20 MG/4 ML (5 MG/ML) INTRAVENOUS SYRINGE
5 EVERY 10 MIN PRN
Status: DISCONTINUED | OUTPATIENT
Start: 2019-09-05 | End: 2019-09-05 | Stop reason: HOSPADM

## 2019-09-05 RX ADMIN — FENTANYL CITRATE 50 MCG: 50 INJECTION INTRAMUSCULAR; INTRAVENOUS at 07:37

## 2019-09-05 RX ADMIN — DEXAMETHASONE SODIUM PHOSPHATE 10 MG: 4 INJECTION, SOLUTION INTRAMUSCULAR; INTRAVENOUS at 07:44

## 2019-09-05 RX ADMIN — SODIUM CHLORIDE: 9 INJECTION, SOLUTION INTRAVENOUS at 07:33

## 2019-09-05 RX ADMIN — FENTANYL CITRATE 50 MCG: 50 INJECTION INTRAMUSCULAR; INTRAVENOUS at 07:49

## 2019-09-05 RX ADMIN — FENTANYL CITRATE 50 MCG: 50 INJECTION INTRAMUSCULAR; INTRAVENOUS at 07:59

## 2019-09-05 RX ADMIN — ONDANSETRON HYDROCHLORIDE 4 MG: 4 INJECTION, SOLUTION INTRAMUSCULAR; INTRAVENOUS at 07:44

## 2019-09-05 RX ADMIN — PROPOFOL 200 MG: 10 INJECTION, EMULSION INTRAVENOUS at 07:37

## 2019-09-05 RX ADMIN — FENTANYL CITRATE 100 MCG: 50 INJECTION INTRAMUSCULAR; INTRAVENOUS at 08:20

## 2019-09-05 RX ADMIN — Medication 2 G: at 07:44

## 2019-09-05 RX ADMIN — SODIUM CHLORIDE: 9 INJECTION, SOLUTION INTRAVENOUS at 06:38

## 2019-09-05 RX ADMIN — MIDAZOLAM HYDROCHLORIDE 2 MG: 1 INJECTION, SOLUTION INTRAMUSCULAR; INTRAVENOUS at 07:33

## 2019-09-05 ASSESSMENT — PAIN DESCRIPTION - FREQUENCY
FREQUENCY: INTERMITTENT

## 2019-09-05 ASSESSMENT — PAIN DESCRIPTION - PAIN TYPE
TYPE: SURGICAL PAIN

## 2019-09-05 ASSESSMENT — PAIN DESCRIPTION - PROGRESSION
CLINICAL_PROGRESSION: NOT CHANGED

## 2019-09-05 ASSESSMENT — PAIN DESCRIPTION - LOCATION
LOCATION: KNEE

## 2019-09-05 ASSESSMENT — PULMONARY FUNCTION TESTS
PIF_VALUE: 13
PIF_VALUE: 1
PIF_VALUE: 19
PIF_VALUE: 29
PIF_VALUE: 17
PIF_VALUE: 12
PIF_VALUE: 6
PIF_VALUE: 10
PIF_VALUE: 16
PIF_VALUE: 17
PIF_VALUE: 16
PIF_VALUE: 3
PIF_VALUE: 13
PIF_VALUE: 16
PIF_VALUE: 11
PIF_VALUE: 17
PIF_VALUE: 1
PIF_VALUE: 1
PIF_VALUE: 12
PIF_VALUE: 14
PIF_VALUE: 17
PIF_VALUE: 13
PIF_VALUE: 16
PIF_VALUE: 18
PIF_VALUE: 18
PIF_VALUE: 11
PIF_VALUE: 17
PIF_VALUE: 14
PIF_VALUE: 17
PIF_VALUE: 17
PIF_VALUE: 14
PIF_VALUE: 18
PIF_VALUE: 4
PIF_VALUE: 17
PIF_VALUE: 17
PIF_VALUE: 21
PIF_VALUE: 1
PIF_VALUE: 17
PIF_VALUE: 16

## 2019-09-05 ASSESSMENT — PAIN DESCRIPTION - DESCRIPTORS
DESCRIPTORS: SORE

## 2019-09-05 ASSESSMENT — PAIN SCALES - GENERAL
PAINLEVEL_OUTOF10: 3
PAINLEVEL_OUTOF10: 4

## 2019-09-05 ASSESSMENT — PAIN - FUNCTIONAL ASSESSMENT: PAIN_FUNCTIONAL_ASSESSMENT: 0-10

## 2019-09-05 ASSESSMENT — PAIN DESCRIPTION - ORIENTATION
ORIENTATION: RIGHT

## 2019-09-05 ASSESSMENT — PAIN DESCRIPTION - ONSET
ONSET: ON-GOING

## 2019-09-05 NOTE — PROGRESS NOTES
Pt returned to [facility]. Report received from [place] PACU. Wife at bedside.  No complaints voiced

## 2019-09-05 NOTE — ANESTHESIA PRE PROCEDURE
Department of Anesthesiology  Preprocedure Note       Name:  Jossue Samira   Age:  41 y.o.  :  1975                                          MRN:  [id number]         Date:  2019      Surgeon: Moriah Orozco):  Ellyn Mark MD    Procedure: HARDWARE REMOVAL RIGHT TIBIAL NAIL (Right )    Medications prior to admission:   Prior to Admission medications    Medication Sig Start Date End Date Taking? Authorizing Provider   ibuprofen (ADVIL;MOTRIN) 400 MG tablet Take 400 mg by mouth every 6 hours as needed for Pain   Yes Historical Provider, MD   ondansetron (ZOFRAN) 4 MG tablet 1 tablet every 6-8 hours for nausea vomiting when necessary 18   Bridger Hernandez MD       Current medications:    Current Facility-Administered Medications   Medication Dose Route Frequency Provider Last Rate Last Dose    sodium chloride flush 0.9 % injection 10 mL  10 mL Intravenous 2 times per day Ellyn Mark MD        sodium chloride flush 0.9 % injection 10 mL  10 mL Intravenous PRN Ellyn Mark MD        0.9 % sodium chloride infusion   Intravenous Continuous Ellyn Mark  mL/hr at 19 1563      ceFAZolin (ANCEF) 2 g in dextrose 5 % 50 mL IVPB  2 g Intravenous On Call to Shanice Jacobsen MD           Allergies:     Allergies   Allergen Reactions    Bactrim [Sulfamethoxazole-Trimethoprim] Rash       Problem List:    Patient Active Problem List   Diagnosis Code    T12 compression fracture ([facility]) S22.080A    Splenomegaly R16.1    Allergic rhinitis J30.9    Superficial phlebitis I80.9    Bloody diarrhea R19.7    Hematochezia K92.1    Painful orthopaedic hardware [facility]) T84.84XA       Past Medical History:        Diagnosis Date    Allergic rhinitis     [facility] ([facility]ome[facility]ovirus infection) [facility] [address]     Fracture, metacarpal 2019    right fifth     Nose fracture 2019    bilateral    Prolonged emergence from general anesthesia     T12 compression fracture ([facility]     Thumb fracture 2019

## 2019-09-06 ENCOUNTER — TELEPHONE (OUTPATIENT)
Dept: FAMILY MEDICINE CLINIC | Age: 44
End: 2019-09-06

## 2019-09-10 ENCOUNTER — APPOINTMENT (OUTPATIENT)
Dept: INTERVENTIONAL RADIOLOGY/VASCULAR | Age: 44
End: 2019-09-10
Payer: COMMERCIAL

## 2019-09-10 ENCOUNTER — APPOINTMENT (OUTPATIENT)
Dept: GENERAL RADIOLOGY | Age: 44
End: 2019-09-10
Payer: COMMERCIAL

## 2019-09-10 ENCOUNTER — HOSPITAL ENCOUNTER (EMERGENCY)
Age: 44
Discharge: HOME | End: 2019-09-10
Payer: COMMERCIAL

## 2019-09-10 VITALS
SYSTOLIC BLOOD PRESSURE: 144 MMHG | TEMPERATURE: 98.1 F | RESPIRATION RATE: 16 BRPM | WEIGHT: 210 LBS | HEIGHT: 74 IN | BODY MASS INDEX: 26.95 KG/M2 | HEART RATE: 94 BPM | OXYGEN SATURATION: 98 % | DIASTOLIC BLOOD PRESSURE: 87 MMHG

## 2019-09-10 DIAGNOSIS — M79.671 RIGHT FOOT PAIN: Primary | ICD-10-CM

## 2019-09-10 PROCEDURE — 73610 X-RAY EXAM OF ANKLE: CPT

## 2019-09-10 PROCEDURE — 73630 X-RAY EXAM OF FOOT: CPT

## 2019-09-10 PROCEDURE — 99283 EMERGENCY DEPT VISIT LOW MDM: CPT

## 2019-09-10 PROCEDURE — 93971 EXTREMITY STUDY: CPT

## 2019-09-10 RX ORDER — CEPHALEXIN 500 MG/1
500 CAPSULE ORAL 2 TIMES DAILY
Qty: 14 CAPSULE | Refills: 0 | Status: SHIPPED | OUTPATIENT
Start: 2019-09-10 | End: 2019-09-17

## 2019-09-10 ASSESSMENT — ENCOUNTER SYMPTOMS
SHORTNESS OF BREATH: 0
COLOR CHANGE: 1

## 2019-09-10 NOTE — ED TRIAGE NOTES
Pt to ED with complaints of right foot pain. Last week pt had a tiffanie removed from his right leg. Noticed today that he was starting to have right foot pain and has a hx of DVT, so concerned for a DVT. Pt states pain only when he puts pressure on it. Upon assessment, there is redness noticed to top of pt's right foot.

## 2019-09-11 NOTE — ED PROVIDER NOTES
(Right, 2019). CURRENT MEDICATIONS       Discharge Medication List as of 9/10/2019 10:36 PM      CONTINUE these medications which have NOT CHANGED    Details   HYDROcodone-acetaminophen (NORCO) 5-325 MG per tablet Take 1-2 tablets by mouth every 4-6 hours as needed for Pain for up to 7 days. , Disp-40 tablet, R-0Print      cyclobenzaprine (FLEXERIL) 10 MG tablet Take 1 tablet by mouth 3 times daily as needed for Muscle spasms, Disp-20 tablet, R-0Print      ondansetron (ZOFRAN) 4 MG tablet 1 tablet every 6-8 hours for nausea vomiting when necessary, Disp-20 tablet, R-0Print      ibuprofen (ADVIL;MOTRIN) 400 MG tablet Take 400 mg by mouth every 6 hours as needed for PainHistorical Med             ALLERGIES     is allergic to bactrim [sulfamethoxazole-trimethoprim]. FAMILY HISTORY     He indicated that his mother is alive. He indicated that his father is . He indicated that his sister is . He indicated that his brother is alive. He indicated that the status of his other is unknown.   family history includes COPD in his father; Lonn Kenning in an other family member; High Blood Pressure in his mother; Prostate Cancer in an other family member. SOCIAL HISTORY      reports that he has never smoked. He has never used smokeless tobacco. He reports that he does not drink alcohol or use drugs. PHYSICAL EXAM     INITIAL VITALS:  height is 6' 2\" (1.88 m) and weight is 210 lb (95.3 kg). His oral temperature is 98.1 °F (36.7 °C). His blood pressure is 144/87 (abnormal) and his pulse is 94. His respiration is 16 and oxygen saturation is 98%. Physical Exam   Constitutional: He appears well-developed. HENT:   Head: Normocephalic and atraumatic. Eyes: Conjunctivae are normal.   Cardiovascular:   Pulses:       Dorsalis pedis pulses are 2+ on the right side. Pulmonary/Chest: Effort normal. No respiratory distress. Musculoskeletal:        Right ankle: He exhibits no swelling. No tenderness.

## 2020-03-24 ENCOUNTER — TELEPHONE (OUTPATIENT)
Dept: FAMILY MEDICINE CLINIC | Age: 45
End: 2020-03-24

## 2020-03-24 NOTE — TELEPHONE ENCOUNTER
Patient called in today requesting a return to work slip. Patient has had a low grade fever 99.2 when he takes medications. Last night it 101.2 but he had not taken any meds. He has a dry cough which he states he has had for about 2 months. He has no SOB and no other complaints. 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 Patient did state that he return from [facility] 2 weeks ago. Please call the patient.

## 2021-11-15 ENCOUNTER — HOSPITAL ENCOUNTER (EMERGENCY)
Age: 46
Discharge: HOME | End: 2021-11-15
Payer: COMMERCIAL

## 2021-11-15 VITALS
WEIGHT: 210 LBS | BODY MASS INDEX: 26.95 KG/M2 | RESPIRATION RATE: 16 BRPM | OXYGEN SATURATION: 97 % | SYSTOLIC BLOOD PRESSURE: 146 MMHG | HEART RATE: 98 BPM | DIASTOLIC BLOOD PRESSURE: 84 MMHG | HEIGHT: 74 IN | TEMPERATURE: 100.3 F

## 2021-11-15 DIAGNOSIS — Z20.822 LAB TEST NEGATIVE FOR COVID-19 VIRUS: ICD-10-CM

## 2021-11-15 DIAGNOSIS — J01.90 ACUTE SINUSITIS, RECURRENCE NOT SPECIFIED, UNSPECIFIED LOCATION: Primary | ICD-10-CM

## 2021-11-15 LAB
FLU A ANTIGEN: NEGATIVE
INFLUENZA B AG, EIA: NEGATIVE
SARS-COV-2, NAA: NOT  DETECTED

## 2021-11-15 PROCEDURE — 99213 OFFICE O/P EST LOW 20 MIN: CPT | Performed by: EMERGENCY MEDICINE

## 2021-11-15 PROCEDURE — 87635 SARS-COV-2 COVID-19 AMP PRB: CPT

## 2021-11-15 PROCEDURE — 87804 INFLUENZA ASSAY W/OPTIC: CPT

## 2021-11-15 PROCEDURE — 99213 OFFICE O/P EST LOW 20 MIN: CPT

## 2021-11-15 RX ORDER — FLUTICASONE PROPIONATE 50 MCG
1 SPRAY, SUSPENSION (ML) NASAL DAILY
Qty: 16 G | Refills: 0 | Status: SHIPPED | OUTPATIENT
Start: 2021-11-15

## 2021-11-15 RX ORDER — AMOXICILLIN AND CLAVULANATE POTASSIUM 875; 125 MG/1; MG/1
1 TABLET, FILM COATED ORAL 2 TIMES DAILY
Qty: 20 TABLET | Refills: 0 | Status: SHIPPED | OUTPATIENT
Start: 2021-11-15 | End: 2021-11-25

## 2021-11-15 ASSESSMENT — ENCOUNTER SYMPTOMS
COLOR CHANGE: 0
RHINORRHEA: 1
SHORTNESS OF BREATH: 0
COUGH: 1
SINUS PAIN: 1
SORE THROAT: 1
SINUS PRESSURE: 1
ABDOMINAL PAIN: 0

## 2021-11-15 ASSESSMENT — PAIN DESCRIPTION - DESCRIPTORS: DESCRIPTORS: ACHING

## 2021-11-15 ASSESSMENT — PAIN DESCRIPTION - LOCATION: LOCATION: GENERALIZED

## 2021-11-15 ASSESSMENT — PAIN DESCRIPTION - PAIN TYPE: TYPE: ACUTE PAIN

## 2021-11-15 ASSESSMENT — PAIN SCALES - GENERAL: PAINLEVEL_OUTOF10: 5

## 2021-11-15 NOTE — ED PROVIDER NOTES
[facility]  Urgent Care Encounter       CHIEF COMPLAINT       Chief Complaint   Patient presents with    Generalized Body Aches     onset 11/14/21    Cough     productive brown    Fever       Nurses Notes reviewed and I agree except as noted in the HPI. HISTORY OF PRESENT ILLNESS   Radhames Ferguson is a 42 y.o. male who presents for cough, sinus congestion, sinus pressure, crackling of the left ear. Temperature 102.7. Body aches and chills. Patient has not had COVID-19 vaccine and has not had previous COVID-19 infection. Reports that at times, sputum is dark brown in color. Patient does not smoke. No known lung issues. HPI    REVIEW OF SYSTEMS     Review of Systems   Constitutional: Positive for chills, fatigue and fever. Negative for diaphoresis. HENT: Positive for congestion, postnasal drip, rhinorrhea, sinus pressure, sinus pain and sore throat. Respiratory: Positive for cough. Negative for shortness of breath. Cardiovascular: Negative for chest pain. Gastrointestinal: Negative for abdominal pain. Skin: Negative for color change. Neurological: Negative for dizziness, light-headedness and headaches. Psychiatric/Behavioral: Negative for behavioral problems. PAST MEDICAL HISTORY         Diagnosis Date    Allergic rhinitis     CMV (cytomegalovirus infection) ([address]     Fracture, metacarpal 08/23/2019    right fifth     Nose fracture 08/23/2019    bilateral    Prolonged emergence from general anesthesia     T12 compression fracture ([address]     Thumb fracture 08/23/2019    left thumb       SURGICALHISTORY     Patient  has a past surgical history that includes Elbow surgery; [place] tooth extraction; fracture surgery (04/25/2016); Tibia fracture surgery; REMOVE HARDWARE FEMUR (N/A, 4/18/2019); and REMOVE HARDWARE FEMUR (Right, 9/5/2019).     CURRENT MEDICATIONS       Discharge Medication List as of 11/15/2021  4:18 PM      CONTINUE these medications which have NOT CHANGED    Details   ibuprofen (ADVIL;MOTRIN) 400 MG tablet Take 400 mg by mouth every 6 hours as needed for PainHistorical Med      ondansetron (ZOFRAN) 4 MG tablet 1 tablet every 6-8 hours for nausea vomiting when necessary, Disp-20 tablet, R-0Print             ALLERGIES     Patient is is allergic to bactrim [sulfamethoxazole-trimethoprim]. Patients   Immunization History   Administered Date(s) Administered    Tdap (Boostrix, Adacel) 08/14/2010, 03/31/2016       FAMILY HISTORY     Patient's family history includes COPD in his father; Colon Cancer in an other family member; High Blood Pressure in his mother; Prostate Cancer in an other family member. SOCIAL HISTORY     Patient  reports that he has never smoked. He has never used smokeless tobacco. He reports that he does not drink alcohol and does not use drugs. PHYSICAL EXAM     ED TRIAGE VITALS  BP: (!) 146/84, Temp: 100.3 °F (37.9 °C), Pulse: 98, Resp: 16, SpO2: 97 %,Estimated body mass index is 26.96 kg/m² as calculated from the following:    Height as of this encounter: 6' 2\" (1.88 m). Weight as of this encounter: 210 lb (95.3 kg). ,No LMP for male patient. Physical Exam  Constitutional:       Appearance: He is ill-appearing. He is not toxic-appearing. HENT:      Head: Normocephalic and atraumatic. Right Ear: Tympanic membrane, ear canal and external ear normal.      Left Ear: Tympanic membrane, ear canal and external ear normal.      Nose: Nose normal.   Cardiovascular:      Rate and Rhythm: Normal rate. Pulses: Normal pulses. Pulmonary:      Effort: Pulmonary effort is normal.   Skin:     General: Skin is warm and dry. Capillary Refill: Capillary refill takes less than 2 seconds. Neurological:      General: No focal deficit present. Mental Status: He is alert.    Psychiatric:         Mood and Affect: Mood normal.         Behavior: Behavior normal.         DIAGNOSTIC RESULTS     Labs:  Results for orders placed or performed during the hospital encounter of 11/15/21   COVID-19, Rapid   Result Value Ref Range    SARS-CoV-2, HAYLEE NOT  DETECTED NOT DETECTED   Rapid influenza A/B antigens   Result Value Ref Range    Flu A Antigen Negative NEGATIVE    Influenza B Ag, EIA Negative NEGATIVE       IMAGING:    No orders to display         EKG:      URGENT CARE COURSE:     Vitals:    11/15/21 1548   BP: (!) 146/84   Pulse: 98   Resp: 16   Temp: 100.3 °F (37.9 °C)   TempSrc: Temporal   SpO2: 97%   Weight: 210 lb (95.3 kg)   Height: 6' 2\" (1.88 m)       Medications - No data to display         PROCEDURES:  None    FINAL IMPRESSION      1. Acute sinusitis, recurrence not specified, unspecified location    2. Lab test negative for COVID-19 virus          DISPOSITION/ PLAN     Patient presents for what is likely sinusitis. He reports 1 and 2.7 fever. Concerning for bacterial rhinosinusitis. Covid and influenza testing negative. Will place patient on Augmentin, Flonase. He will continue over-the-counter decongestants. Drink plenty of fluids. Tylenol/ibuprofen for fever control. Return for new or worsening symptoms.       PATIENT REFERRED TO:  Tera Aguiar DO  [address] [facility]  / [facility] [place] [address]      DISCHARGE MEDICATIONS:  Discharge Medication List as of 11/15/2021  4:18 PM      START taking these medications    Details   amoxicillin-clavulanate (AUGMENTIN) 875-125 MG per tablet Take 1 tablet by mouth 2 times daily for 10 days, Disp-20 tablet, R-0Normal      fluticasone (FLONASE) 50 MCG/ACT nasal spray 1 spray by Each Nostril route daily, Disp-16 g, R-0Normal             Discharge Medication List as of 11/15/2021  4:18 PM          Discharge Medication List as of 11/15/2021  4:18 PM          KEN Marti - CNP    (Please note that portions of this note were completed with a voice recognition program. Efforts were made to edit the dictations but occasionally words are mis-transcribed.)          KEN Marti - CNP  11/15/21 3622

## 2021-11-15 NOTE — ED TRIAGE NOTES
Pt c/o cough, body aches and fever onset 11/14/21. Pt states he did have Covid 8/2021. Denies vaccination.

## 2021-11-15 NOTE — ED NOTES
Pt verbalized discharge instructions. Pt informed to go to ER if develop chest pain, shortness of breath or abdominal pain. Pt ambulatory out in stable condition. Assessment unchanged.        Katelynn Pritchard RN  11/15/21 [id number]

## 2021-12-09 ENCOUNTER — TELEPHONE (OUTPATIENT)
Dept: FAMILY MEDICINE CLINIC | Age: 46
End: 2021-12-09

## 2021-12-09 ENCOUNTER — OFFICE VISIT (OUTPATIENT)
Dept: FAMILY MEDICINE CLINIC | Age: 46
End: 2021-12-09
Payer: COMMERCIAL

## 2021-12-09 ENCOUNTER — HOSPITAL ENCOUNTER (OUTPATIENT)
Age: 46
Discharge: HOME | End: 2021-12-09
Payer: COMMERCIAL

## 2021-12-09 ENCOUNTER — HOSPITAL ENCOUNTER (OUTPATIENT)
Dept: GENERAL RADIOLOGY | Age: 46
Discharge: HOME | End: 2021-12-09
Payer: COMMERCIAL

## 2021-12-09 VITALS
OXYGEN SATURATION: 99 % | SYSTOLIC BLOOD PRESSURE: 138 MMHG | DIASTOLIC BLOOD PRESSURE: 86 MMHG | TEMPERATURE: 97.9 F | HEIGHT: 74 IN | RESPIRATION RATE: 14 BRPM | BODY MASS INDEX: 28.28 KG/M2 | HEART RATE: 94 BPM | WEIGHT: 220.4 LBS

## 2021-12-09 DIAGNOSIS — J01.90 SUBACUTE SINUSITIS, UNSPECIFIED LOCATION: ICD-10-CM

## 2021-12-09 DIAGNOSIS — R05.9 COUGH: ICD-10-CM

## 2021-12-09 DIAGNOSIS — J01.90 SUBACUTE SINUSITIS, UNSPECIFIED LOCATION: Primary | ICD-10-CM

## 2021-12-09 LAB
Lab: NORMAL
QC PASS/FAIL: NORMAL
SARS-COV-2 RDRP RESP QL NAA+PROBE: NEGATIVE

## 2021-12-09 PROCEDURE — 71046 X-RAY EXAM CHEST 2 VIEWS: CPT

## 2021-12-09 PROCEDURE — 87635 SARS-COV-2 COVID-19 AMP PRB: CPT | Performed by: FAMILY MEDICINE

## 2021-12-09 PROCEDURE — 99203 OFFICE O/P NEW LOW 30 MIN: CPT | Performed by: FAMILY MEDICINE

## 2021-12-09 RX ORDER — DOXYCYCLINE HYCLATE 100 MG/1
100 CAPSULE ORAL 2 TIMES DAILY
Qty: 20 CAPSULE | Refills: 0 | Status: SHIPPED | OUTPATIENT
Start: 2021-12-09 | End: 2021-12-19

## 2021-12-09 RX ORDER — PREDNISONE 10 MG/1
TABLET ORAL
Qty: 30 TABLET | Refills: 0 | Status: SHIPPED | OUTPATIENT
Start: 2021-12-09 | End: 2021-12-25

## 2021-12-09 ASSESSMENT — PATIENT HEALTH QUESTIONNAIRE - PHQ9
1. LITTLE INTEREST OR PLEASURE IN DOING THINGS: 0
2. FEELING DOWN, DEPRESSED OR HOPELESS: 0
SUM OF ALL RESPONSES TO PHQ QUESTIONS 1-9: 0
SUM OF ALL RESPONSES TO PHQ9 QUESTIONS 1 & 2: 0

## 2021-12-09 NOTE — TELEPHONE ENCOUNTER
----- Message from Rl De Luna, DO sent at 12/9/2021  4:36 PM EST -----  Please let pt know that chest xray is normal  Let me know if questions, thanks!

## 2021-12-09 NOTE — PROGRESS NOTES
Chief Complaint   Patient presents with    Cough    Headache    Sinus Problem       History obtained from the patient. SUBJECTIVE:  Malika Valerio is a 42 y.o. male that presents today for re-establishing care with new physician, etc. New patient, [facility] to [facility] since 9/20/2016, by definition is a new patient. -URI type sxs:  Going on for 3 to 4 wks  Treated with augmentin, helped some  con't to have cough, productive, nasal congestion, drainage, facial pressure  No fevers  No SOB  No body aches  Not not getting better    Fever - No  Runny nose or congestion -  Yes   Cough -  Yes  Sore throat -  Yes  Headache, fatigue, joint pains, muscle aches -  No  Double Sickening - Yes  Shortness of breath/Wheezing? -  No  Nausea/Vomiting/Diarrhea? No  Sick contacts - Yes  Maxillary Tooth Pain -  Yes  Treatment tried and response - as above.        Age/Gender Health Maintenance    Lipid -   No components found for: CHLPL  Lab Results   Component Value Date    TRIG 30 05/09/2013    TRIG 46 03/24/2011    TRIG 119 03/29/2010     Lab Results   Component Value Date    HDL 49 05/09/2013    HDL 45 03/24/2011    HDL 44 03/29/2010     Lab Results   Component Value Date    LDLCALC 75 05/09/2013    LDLCALC 77 03/24/2011    [address] 69 03/29/2010     No results found for: LABVLDL    DM Screen -   Lab Results   Component Value Date    GLUCOSE 115 06/12/2021         Colon Cancer Screening - Age 48    Tetanus - UTD 3/2016  Influenza Vaccine - Candidate FALL 2021  Pneumonia Vaccine - Age 72  Zoster - age 48     PSA testing discussion - Age 54  AAA Screening - Age 72 if smoked    Falls screening - N/A      Current Outpatient Medications   Medication Sig Dispense Refill    doxycycline hyclate (VIBRAMYCIN) 100 MG capsule Take 1 capsule by mouth 2 times daily for 10 days 20 capsule 0    predniSONE (DELTASONE) 10 MG tablet 4 tabs PO ONCE Daily x 4 days, then 2 tabs PO ONCE daily x 4 days, then 1 TAB PO ONCE daily x 4 days, then 1/2 TAB PO ONCE daily x 4 days 30 tablet 0    fluticasone (FLONASE) 50 MCG/ACT nasal spray 1 spray by Each Nostril route daily 16 g 0     No current facility-administered medications for this visit. Orders Placed This Encounter   Medications    doxycycline hyclate (VIBRAMYCIN) 100 MG capsule     Sig: Take 1 capsule by mouth 2 times daily for 10 days     Dispense:  20 capsule     Refill:  0    predniSONE (DELTASONE) 10 MG tablet     Si tabs PO ONCE Daily x 4 days, then 2 tabs PO ONCE daily x 4 days, then 1 TAB PO ONCE daily x 4 days, then 1/2 TAB PO ONCE daily x 4 days     Dispense:  30 tablet     Refill:  0         All medications reviewed and reconciled, including OTC and herbal medications. Updated list given to patient.        Patient Active Problem List   Diagnosis    T12 compression fracture (HCC)    Splenomegaly    Allergic rhinitis    Superficial phlebitis    Bloody diarrhea    Hematochezia    Painful orthopaedic hardware [facility])       Past Medical History:   Diagnosis Date    Allergic rhinitis     [facility] [address]     Fracture, metacarpal 2019    right fifth     Nose fracture 2019    bilateral    Prolonged emergence from general anesthesia     [facility]12 compression fracture [facility] [address]     Thumb fracture 2019    left thumb       Past Surgical History:   Procedure Laterality Date    ELBOW SURGERY      right    FRACTURE SURGERY  2016    right leg    REMOVE HARDWARE FEMUR N/A 2019    HARDWARE REMOVAL RIGHT TIBIA ALL SCREWS performed by Manny Nyhan, MD at [place] [address] Right 2019    HARDWARE REMOVAL RIGHT TIBIAL NAIL performed by Manny Nyhan, MD at [address] [place] [address]      right leg    WISDOM TOOTH EXTRACTION         Allergies   Allergen Reactions    Bactrim [Sulfamethoxazole-Trimethoprim] Rash       Social History     Tobacco Use    Smoking status: Never Smoker    Smokeless tobacco: Never Used   Substance Use Topics    Alcohol use: No       Family History   Problem Relation Age of Onset    High Blood Pressure Mother     COPD Father     Colon Cancer Other     Prostate Cancer Other          I have reviewed the patient's past medical history, past surgical history, allergies, medications, social and family history and I have made updates where appropriate. Review of Systems  Positive responses are highlighted in bold    Constitutional:  Fever, Chills, Night Sweats, Fatigue, Unexpected changes in weight  HENT:  Ear pain, Tinnitus, Nosebleeds, Trouble swallowing, Hearing loss, Sore throat  Cardiovascular:  Chest Pain, Palpitations, Orthopnea, Paroxysmal Nocturnal Dyspnea  Respiratory:  Cough, Wheezing, Shortness of breath, Chest tightness, Apnea  Gastrointestinal:  Nausea, Vomiting, Diarrhea, Constipation, Heartburn, Blood in stool  Genitourinary:  Difficulty or painful urination, Flank pain, Change in frequency, Urgency  Skin:  Color change, Rash, Itching, Wound  Musculoskeletal:  Joint pain, Back pain, Gait problems, Joint swelling, Myalgias  Neurological:  Dizziness, Headaches, Presyncope, Numbness, Seizures, Tremors  Endocrine:  Heat Intolerance, Cold Intolerance, Polydipsia, Polyphagia, Polyuria      PHYSICAL EXAM:  Vitals:    12/09/21 1442   BP: 138/86   Pulse: 94   Resp: 14   Temp: 97.9 °F (36.6 °C)   TempSrc: Oral   SpO2: 99%   Weight: 220 lb 6.4 oz (100 kg)   Height: 6' 2\" (1.88 m)     Body mass index is 28.3 kg/m².   Pain Score:   2 (Throat)    VS Reviewed  General Appearance: A&O x 3, No acute distress,well developed and well- nourished  Eyes: pupils equal, round, and reactive to light, extraocular eye movements intact, conjunctivae and eye lids without erythema  ENT: bilateral TM normal without fluid or infection, neck without nodes, throat normal without erythema or exudate, sinuses nontender, post nasal drip noted, nasal mucosa congested and Oropharynx clear, without erythema, exudate, or thrush. Neck: supple and non-tender without mass, no thyromegaly or thyroid nodules, no cervical lymphadenopathy  Pulmonary/Chest: clear to auscultation bilaterally- no wheezes, rales or rhonchi, normal air movement, no respiratory distress or retractions  Cardiovascular: S1 and S2 auscultated w/ RRR. No murmurs, rubs, clicks, or gallops, distal pulses intact. Abdomen: soft, non-tender, non-distended, bowel sounds physiologic,  no rebound or guarding, no masses or hernias noted. Liver and spleen without enlargement. Extremities: no cyanosis, clubbing or edema of the lower extremities. Skin: warm and dry, no rash or erythema      Component      Latest Ref Rng & Units 12/9/2021           3:07 PM   SARS-COV-2, RdRp gene      Negative Negative   Lot Number       [id number]   QC Pass/Fail       pass       ASSESSMENT & PLAN  1. Subacute sinusitis, unspecified location    Probable subacute sinusitis  Less likely post-infectious cough    VSS  Exam reassuring    Plan:  Doxy x 10 days, pred taper  cxr  F/u if not improving    - XR CHEST (2 VW); Future  - doxycycline hyclate (VIBRAMYCIN) 100 MG capsule; Take 1 capsule by mouth 2 times daily for 10 days  Dispense: 20 capsule; Refill: 0  - predniSONE (DELTASONE) 10 MG tablet; 4 tabs PO ONCE Daily x 4 days, then 2 tabs PO ONCE daily x 4 days, then 1 TAB PO ONCE daily x 4 days, then 1/2 TAB PO ONCE daily x 4 days  Dispense: 30 tablet; Refill: 0  - POCT COVID-19, Rapid    2. Cough    - XR CHEST (2 VW); Future  - doxycycline hyclate (VIBRAMYCIN) 100 MG capsule; Take 1 capsule by mouth 2 times daily for 10 days  Dispense: 20 capsule; Refill: 0  - predniSONE (DELTASONE) 10 MG tablet; 4 tabs PO ONCE Daily x 4 days, then 2 tabs PO ONCE daily x 4 days, then 1 TAB PO ONCE daily x 4 days, then 1/2 TAB PO ONCE daily x 4 days  Dispense: 30 tablet; Refill: 0  - POCT COVID-19, Rapid      DISPOSITION    Return if symptoms worsen or fail to improve.     Jaymes Seip released without restrictions. PATIENT COUNSELING    Barriers to learning and self management: none    Discussed use, benefit, and side effects of prescribed medications. Barriers to medication compliance addressed. All patient questions answered. Pt voiced understanding.        Electronically signed by Valente Guevara DO on 12/9/2021 at 3:10 PM

## 2022-07-16 ENCOUNTER — APPOINTMENT (OUTPATIENT)
Dept: GENERAL RADIOLOGY | Age: 47
End: 2022-07-16
Payer: COMMERCIAL

## 2022-07-16 ENCOUNTER — HOSPITAL ENCOUNTER (EMERGENCY)
Age: 47
Discharge: HOME | End: 2022-07-16
Attending: EMERGENCY MEDICINE
Payer: COMMERCIAL

## 2022-07-16 VITALS
HEART RATE: 78 BPM | WEIGHT: 220 LBS | SYSTOLIC BLOOD PRESSURE: 137 MMHG | DIASTOLIC BLOOD PRESSURE: 80 MMHG | OXYGEN SATURATION: 98 % | TEMPERATURE: 98.1 F | BODY MASS INDEX: 28.25 KG/M2 | RESPIRATION RATE: 18 BRPM

## 2022-07-16 DIAGNOSIS — S60.229A CONTUSION OF HAND INCLUDING FINGERS, UNSPECIFIED LATERALITY, INITIAL ENCOUNTER: Primary | ICD-10-CM

## 2022-07-16 DIAGNOSIS — S60.00XA CONTUSION OF HAND INCLUDING FINGERS, UNSPECIFIED LATERALITY, INITIAL ENCOUNTER: Primary | ICD-10-CM

## 2022-07-16 PROCEDURE — 73130 X-RAY EXAM OF HAND: CPT

## 2022-07-16 PROCEDURE — 99283 EMERGENCY DEPT VISIT LOW MDM: CPT

## 2022-07-16 RX ORDER — IBUPROFEN 600 MG/1
600 TABLET ORAL 3 TIMES DAILY PRN
Qty: 30 TABLET | Refills: 0 | Status: SHIPPED | OUTPATIENT
Start: 2022-07-16

## 2022-07-16 ASSESSMENT — PAIN DESCRIPTION - LOCATION: LOCATION: HAND

## 2022-07-16 ASSESSMENT — PAIN DESCRIPTION - DESCRIPTORS: DESCRIPTORS: ACHING

## 2022-07-16 ASSESSMENT — ENCOUNTER SYMPTOMS
SHORTNESS OF BREATH: 0
EYE DISCHARGE: 0
SORE THROAT: 0
NAUSEA: 0
COUGH: 0
VOMITING: 0

## 2022-07-16 ASSESSMENT — PAIN SCALES - GENERAL: PAINLEVEL_OUTOF10: 1

## 2022-07-16 ASSESSMENT — PAIN DESCRIPTION - PAIN TYPE: TYPE: ACUTE PAIN

## 2022-07-16 ASSESSMENT — PAIN DESCRIPTION - ONSET: ONSET: SUDDEN

## 2022-07-16 ASSESSMENT — PAIN DESCRIPTION - FREQUENCY: FREQUENCY: CONTINUOUS

## 2022-07-16 ASSESSMENT — PAIN DESCRIPTION - ORIENTATION: ORIENTATION: RIGHT;LEFT

## 2022-07-16 ASSESSMENT — PAIN - FUNCTIONAL ASSESSMENT: PAIN_FUNCTIONAL_ASSESSMENT: 0-10

## 2022-07-16 NOTE — Clinical Note
Caprice Foster was seen and treated in our emergency department on 7/16/2022. He may return to work on 07/25/2022. If you have any questions or concerns, please don't hesitate to call.       Simin Pat MD

## 2022-07-16 NOTE — ED NOTES
Patient to the ED after accidentally getting bilateral 2nd and 3rd proximal digits smashed between heavy tools at home. Patient has full ROM and cap refill is intact. No obvious wounds. Patient is resting in chair with easy and unlabored respirations. No distress noted. Call light in reach. Patient denies further complaints or concerns.         Michelle De Santiago RN  07/16/22 [id number]

## 2022-07-16 NOTE — ED PROVIDER NOTES
[facility] ENCOUNTER          Pt Name: Shawn De Souza  MRN: [id number]  [place] 1975  Date of evaluation: 7/16/2022  Treating Resident Physician: Deborah Coppola MD  Supervising Physician: Dr. Osmani Bryan    History obtained from chart review and the patient. CHIEF COMPLAINT       Chief Complaint   Patient presents with    Hand Injury     Bilateral 2nd/3rd fingers           HISTORY OF PRESENT ILLNESS    HPI  Shawn De Souza is a 42 y.o. male who presents to the emergency department for evaluation of hand injury. Pt was working at home today when his hands were pinned under skid  (~1000 lbs) for approximately 30 seconds. There was initially pain and swelling. He took Tylenol and ibuprofen. Pain is 1/10 at rest and 3/10 with movement. No skin break. Third digit on right hand is the most painful. Sensation and motion normal per pt but strength decreased. The patient has no other acute complaints at this time. REVIEW OF SYSTEMS   Review of Systems   Constitutional:  Negative for chills. HENT:  Negative for sore throat. Eyes:  Negative for discharge and visual disturbance. Respiratory:  Negative for cough and shortness of breath. Gastrointestinal:  Negative for nausea and vomiting. Genitourinary:  Negative for difficulty urinating. Musculoskeletal:  Positive for joint swelling (second and third digits hands bilaterally). Neurological:  Negative for numbness.        PAST MEDICAL AND SURGICAL HISTORY     Past Medical History:   Diagnosis Date    Allergic rhinitis     CMV (cytomegalovirus infection) ([facility] [address]     Fracture, metacarpal 08/23/2019    right fifth     Nose fracture 08/23/2019    bilateral    Prolonged emergence from general anesthesia     T12 compression fracture (HCC)     Thumb fracture 08/23/2019    left thumb     Past Surgical History:   Procedure Laterality Date    ELBOW SURGERY      right    FRACTURE SURGERY 04/25/2016    right leg    REMOVE HARDWARE FEMUR N/A 4/18/2019    HARDWARE REMOVAL RIGHT TIBIA ALL SCREWS performed by Bandar Johnston MD at [address] [place] [address] Drive Right 9/5/2019    HARDWARE REMOVAL RIGHT TIBIAL NAIL performed by Bandar Johnston MD at [place] [address]      right leg    WISDOM TOOTH EXTRACTION           MEDICATIONS   No current facility-administered medications for this encounter. Current Outpatient Medications:     fluticasone (FLONASE) 50 MCG/ACT nasal spray, 1 spray by Each Nostril route daily, Disp: 16 g, Rfl: 0      SOCIAL HISTORY     Social History     Social History Narrative    Not on file     Social History     Tobacco Use    Smoking status: Never    Smokeless tobacco: Never   Vaping Use    Vaping Use: Never used   Substance Use Topics    Alcohol use: No    Drug use: No         ALLERGIES     Allergies   Allergen Reactions    Bactrim [Sulfamethoxazole-Trimethoprim] Rash         FAMILY HISTORY     Family History   Problem Relation Age of Onset    High Blood Pressure Mother     COPD Father     Colon Cancer Other     Prostate Cancer Other          PREVIOUS RECORDS   Previous records reviewed:  ED visit 11/15/21 sinusitis  . PHYSICAL EXAM     ED Triage Vitals [07/16/22 1636]   BP Temp Temp src Heart Rate Resp SpO2 Height Weight   137/80 98.1 °F (36.7 °C) -- 78 18 98 % -- 220 lb (99.8 kg)     Initial vital signs and nursing assessment reviewed and normal. Body mass index is 28.25 kg/m². Pulsoximetry is normal per my interpretation. Additional Vital Signs:  Vitals:    07/16/22 1636   BP: 137/80   Pulse: 78   Resp: 18   Temp: 98.1 °F (36.7 °C)   SpO2: 98%       Physical Exam  Constitutional:       Appearance: Normal appearance. HENT:      Head: Normocephalic and atraumatic. Nose: No rhinorrhea. Mouth/Throat:      Mouth: Mucous membranes are moist.   Eyes:      General: No scleral icterus. Cardiovascular:      Rate and Rhythm: Normal rate. Pulmonary:      Effort: Pulmonary effort is normal.   Abdominal:      General: Abdomen is flat. Musculoskeletal:         General: Swelling (second and third digits of bilateral hands) present. No deformity (No obvious gross deformity of the hand). Cervical back: Normal range of motion. Comments: Sensation intact. Nail intact. Skin:     General: Skin is warm. Capillary Refill: Capillary refill takes less than 2 seconds. Findings: No rash. Comments: Skin of bilateral hands intact   Neurological:      Mental Status: He is alert and oriented to person, place, and time. Psychiatric:         Mood and Affect: Mood normal.         Thought Content: Thought content normal.           MEDICAL DECISION MAKING   Initial Assessment:   Pin/crush injury of the second and third digits of the bilateral hands. Neurovascularly intact without gross deformity. Pain controlled. Plan:   Bilateral x-rays of the hands. ED RESULTS   Laboratory results: No labs  Labs Reviewed - No data to display    Radiologic studies results:  XR HAND LEFT (MIN 3 VIEWS)   Final Result   1. No acute bony abnormality. **This report has been created using voice recognition software. It may contain minor errors which are inherent in voice recognition technology. **      Final report electronically signed by Dr Clay Helm on 7/16/2022 5:24 PM      XR HAND RIGHT (MIN 3 VIEWS)   Final Result   1. No acute bony abnormality. **This report has been created using voice recognition software. It may contain minor errors which are inherent in voice recognition technology. **      Final report electronically signed by Dr Clay Helm on 7/16/2022 5:25 PM          ED Medications administered this visit: Medications - No data to display      ED COURSE   Patient arrived with a crush injury to the hands. Skin intact and neurovascularly intact. Vitals stable.   Imaging ordered and negative for acute fracture. Patient counseled to continue ibuprofen and Tylenol for pain control and to return if he begins experiencing symptoms of compartment syndrome. Strict return precautions and follow up instructions were discussed with the patient prior to discharge, with which the patient agrees. MEDICATION CHANGES     New Prescriptions    No medications on file         FINAL DISPOSITION     Final diagnoses:   Contusion of hand including fingers, unspecified laterality, initial encounter     Condition: good  Dispo: Discharge to home      This transcription was electronically signed. Parts of this transcriptions may have been dictated by use of voice recognition software and electronically transcribed, and parts may have been transcribed with the assistance of an ED scribe. The transcription may contain errors not detected in proofreading. Please refer to my supervising physician's documentation if my documentation differs.     Electronically Signed: Columba Chandler MD, 07/16/22, 5:38 PM         Columba Chandler MD  Resident  07/16/22 [id number]

## 2022-07-16 NOTE — Clinical Note
Deborah Enriquez was seen and treated in our emergency department on 7/16/2022. He may return to work on 07/19/2022. If you have any questions or concerns, please don't hesitate to call.       Eren Canas MD

## 2022-07-16 NOTE — DISCHARGE INSTRUCTIONS
You were seen in the Emergency Department today for a crush injury to your hands. X-rays were negative for fracture. Continue with Tylenol and ibuprofen for pain control. Ibuprofen was sent to [place] [id number] in [facility].

## 2022-07-16 NOTE — Clinical Note
Ivonneyovani Krystal was seen and treated in our emergency department on 7/16/2022. He may return to work on 07/19/2022. If you have any questions or concerns, please don't hesitate to call.       Shane Galarza MD

## 2022-07-18 ENCOUNTER — OFFICE VISIT (OUTPATIENT)
Dept: FAMILY MEDICINE CLINIC | Age: 47
End: 2022-07-18
Payer: COMMERCIAL

## 2022-07-18 VITALS
BODY MASS INDEX: 28.11 KG/M2 | SYSTOLIC BLOOD PRESSURE: 134 MMHG | OXYGEN SATURATION: 100 % | DIASTOLIC BLOOD PRESSURE: 80 MMHG | HEART RATE: 73 BPM | TEMPERATURE: 97.7 F | HEIGHT: 74 IN | WEIGHT: 219 LBS

## 2022-07-18 DIAGNOSIS — M25.641 DECREASED RANGE OF MOTION OF FINGERS OF BOTH HANDS: ICD-10-CM

## 2022-07-18 DIAGNOSIS — M25.642 DECREASED RANGE OF MOTION OF FINGERS OF BOTH HANDS: ICD-10-CM

## 2022-07-18 DIAGNOSIS — M79.644 PAIN IN FINGER OF BOTH HANDS: ICD-10-CM

## 2022-07-18 DIAGNOSIS — S67.20XD CRUSHING INJURY OF HAND, UNSPECIFIED LATERALITY, SUBSEQUENT ENCOUNTER: Primary | ICD-10-CM

## 2022-07-18 DIAGNOSIS — S60.039D: ICD-10-CM

## 2022-07-18 DIAGNOSIS — M79.645 PAIN IN FINGER OF BOTH HANDS: ICD-10-CM

## 2022-07-18 PROCEDURE — 99214 OFFICE O/P EST MOD 30 MIN: CPT | Performed by: NURSE PRACTITIONER

## 2022-07-18 ASSESSMENT — PATIENT HEALTH QUESTIONNAIRE - PHQ9
SUM OF ALL RESPONSES TO PHQ QUESTIONS 1-9: 0
SUM OF ALL RESPONSES TO PHQ9 QUESTIONS 1 & 2: 0
1. LITTLE INTEREST OR PLEASURE IN DOING THINGS: 0
SUM OF ALL RESPONSES TO PHQ QUESTIONS 1-9: 0
SUM OF ALL RESPONSES TO PHQ QUESTIONS 1-9: 0
2. FEELING DOWN, DEPRESSED OR HOPELESS: 0
SUM OF ALL RESPONSES TO PHQ QUESTIONS 1-9: 0

## 2022-07-18 ASSESSMENT — ENCOUNTER SYMPTOMS
COLOR CHANGE: 1
RESPIRATORY NEGATIVE: 1

## 2022-07-18 NOTE — LETTER
[address] [place] [address]. [place] [address]  Phone: [phone]  Fax: [phone]    KEN Ledesma CNP        July 18, 2022     Patient: Niurka Cervantes   YOB: 1975   Date of Visit: 7/18/2022       To Whom It May Concern: It is my medical opinion that Lory Schultz may return to work on Monday July 25th, 2022 without restriction. Please excuse patient from work on Saturday July 16th, through July 24th, 2022. If you have any questions or concerns, please don't hesitate to call.     Sincerely,        KEN Ledesma CNP

## 2022-07-18 NOTE — PROGRESS NOTES
[address]  [address] [place] [address]  Dept: [phone]  Dept Fax: [phone]  Loc: [phone]    Shahrzad Bertrand is a 42 y.o. Cindermyles Mathis presents today for his medical conditions/complaints as noted below. James Angry c/o of Follow-up (Hand injury seen in ED. Hand feeling better, having problems with finger still. ( Tractor injury))      HPI:      Pt presents to office from an ER f/u. Pt had been working at home and his 2nd and 3rd bilateral fingers became trapped in a skid . He had x-rays at ER that were negative for any fractures. He was neurovascularly intact and sent home. Pt presents today states the pain has lessened, it is 2/10 he does still take motrin for swelling. 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 He requests a work note. HPI below         HPI  Shahrzad Bertrand is a 42 y.o. male who presents to the emergency department for evaluation of hand injury. Pt was working at home today when his hands were pinned under skid  (~1000 lbs) for approximately 30 seconds. There was initially pain and swelling. He took Tylenol and ibuprofen. Pain is 1/10 at rest and 3/10 with movement. No skin break. Third digit on right hand is the most painful. Sensation and motion normal per pt but strength decreased. The patient has no other acute complaints at this time. Current Outpatient Medications   Medication Sig Dispense Refill    ibuprofen (ADVIL;MOTRIN) 600 MG tablet Take 1 tablet by mouth 3 times daily as needed for Pain 30 tablet 0    fluticasone (FLONASE) 50 MCG/ACT nasal spray 1 spray by Each Nostril route daily 16 g 0     No current facility-administered medications for this visit.           Past Medical History:   Diagnosis Date    Allergic rhinitis     CMV (cytomegalovirus infection) ([facility] [address]     Fracture, metacarpal 08/23/2019    right fifth     Nose fracture 08/23/2019    bilateral    Prolonged emergence from general anesthesia     T12 compression fracture (HCC)     Thumb fracture 08/23/2019    left thumb      Past Surgical History:   Procedure Laterality Date    ELBOW SURGERY      right    FRACTURE SURGERY  04/25/2016    right leg    REMOVE HARDWARE FEMUR N/A 4/18/2019    HARDWARE REMOVAL RIGHT TIBIA ALL SCREWS performed by Kori Becker MD at [address] Right 9/5/2019    HARDWARE REMOVAL RIGHT TIBIAL NAIL performed by Kori Becker MD at [address] [place] [address]      right leg    WISDOM TOOTH EXTRACTION       Family History   Problem Relation Age of Onset    High Blood Pressure Mother     COPD Father     Colon Cancer Other     Prostate Cancer Other      Social History     Tobacco Use    Smoking status: Never    Smokeless tobacco: Never   Substance Use Topics    Alcohol use: No        Allergies   Allergen Reactions    Bactrim [Sulfamethoxazole-Trimethoprim] Rash       Health Maintenance   Topic Date Due    COVID-19 Vaccine (1) Never done    HIV screen  Never done    Hepatitis C screen  Never done    Diabetes screen  Never done    Lipids  05/09/2018    Flu vaccine (1) 09/01/2022    Depression Screen  12/09/2022    DTaP/Tdap/Td vaccine (4 - Td or Tdap) 03/31/2026    Hepatitis A vaccine  Aged Out    Hepatitis B vaccine  Aged Out    Hib vaccine  Aged Out    Meningococcal (ACWY) vaccine  Aged Out    Pneumococcal 0-64 years Vaccine  Aged Out       Subjective:      Review of Systems   Constitutional:  Negative for fatigue. Respiratory: Negative. Cardiovascular: Negative. Musculoskeletal:  Positive for arthralgias, joint swelling and myalgias. Skin:  Positive for color change and wound. Neurological:  Positive for weakness. Negative for numbness.      Objective:      /80 (Site: Left Upper Arm)   Pulse 73   Temp 97.7 °F (36.5 °C)   Ht 6' 2\" (1.88 m)   Wt 219 lb (99.3 kg)   SpO2 100%   BMI 28.12 kg/m²      Physical Exam  Vitals and nursing note reviewed. Constitutional:       Appearance: He is not ill-appearing. Cardiovascular:      Rate and Rhythm: Normal rate and regular rhythm. Pulses: Normal pulses. Heart sounds: Normal heart sounds. No murmur heard. Musculoskeletal:      Right hand: Swelling, tenderness and bony tenderness present. No deformity. Decreased range of motion. Decreased strength. Normal strength of finger abduction. Normal sensation. Normal capillary refill. Normal pulse. Left hand: Swelling, tenderness and bony tenderness present. No deformity. Decreased range of motion. Decreased strength. Normal strength of finger abduction. Normal sensation. Normal capillary refill. Normal pulse. Comments: Bilateral hands with 2nd and 3rd digit metacarpal joint with erythema and ecchymosis and tenderness with palpation. NO open sores, also admits to tenderness of 2nd and 3rd PIP joint with tenderness with palpation. Pt able to perform pincer grasp and flex all fingers bilaterally but with effort. Weak hand grasp bilaterally    Neurovascularly pt intact no signs of impending tendon or nerve damage. No signs of crush injury to fingers. Bilateral radial pulses equal and strong. cap refill 2+ bilaterally all fingers. Skin:     General: Skin is warm and dry. Capillary Refill: Capillary refill takes less than 2 seconds. Findings: Bruising and erythema present. Neurological:      General: No focal deficit present. Mental Status: He is alert and oriented to person, place, and time. Assessment/Plan:           1. Crushing injury of hand, unspecified laterality, subsequent encounter  Stable  I did offer pt activities to perform to increase strength of hand grasp and improve finger mobility.   Advised if symptoms continue or progress will refer to OT  Pt in agreement with plan  Work note given. Neurovascular checks intact no signs of tissue damage. 2. Pain in finger of both hands  #2  May continue with ibuprofen    3. Decreased range of motion of fingers of both hands  #1    4. Contusion of middle finger without damage to nail, unspecified laterality, subsequent encounter  Ice  Monitor  #1      Return if symptoms worsen or fail to improve. Reccommended tobaccocessation options including pharmacologic methods, counseled great than 3 minutesduring this visit:  Yes[]  No  []       Patient given educational materials -see patient instructions. Discussed use, benefit, and side effects of prescribedmedications. All patient questions answered. Pt voiced understanding. Reviewedhealth maintenance. Instructed to continue current medications, diet and exercise. Patient agreed with treatment plan. Follow up as directed.        Electronicallysigned by KEN Lewis CNP on 7/18/2022 at 12:01 PM

## 2022-12-03 ENCOUNTER — HOSPITAL ENCOUNTER (EMERGENCY)
Age: 47
Discharge: HOME | End: 2022-12-03
Payer: COMMERCIAL

## 2022-12-03 VITALS
SYSTOLIC BLOOD PRESSURE: 150 MMHG | HEART RATE: 81 BPM | OXYGEN SATURATION: 100 % | DIASTOLIC BLOOD PRESSURE: 82 MMHG | RESPIRATION RATE: 20 BRPM | TEMPERATURE: 98 F

## 2022-12-03 DIAGNOSIS — J01.10 ACUTE FRONTAL SINUSITIS, RECURRENCE NOT SPECIFIED: Primary | ICD-10-CM

## 2022-12-03 LAB
FLU A ANTIGEN: NEGATIVE
INFLUENZA B AG, EIA: NEGATIVE
SARS-COV-2, NAA: NOT  DETECTED

## 2022-12-03 PROCEDURE — 99213 OFFICE O/P EST LOW 20 MIN: CPT | Performed by: NURSE PRACTITIONER

## 2022-12-03 PROCEDURE — 87804 INFLUENZA ASSAY W/OPTIC: CPT

## 2022-12-03 PROCEDURE — 87635 SARS-COV-2 COVID-19 AMP PRB: CPT

## 2022-12-03 PROCEDURE — 99213 OFFICE O/P EST LOW 20 MIN: CPT

## 2022-12-03 RX ORDER — METHYLPREDNISOLONE 4 MG/1
TABLET ORAL
Qty: 1 KIT | Refills: 0 | Status: SHIPPED | OUTPATIENT
Start: 2022-12-03 | End: 2022-12-09

## 2022-12-03 RX ORDER — AMOXICILLIN AND CLAVULANATE POTASSIUM 875; 125 MG/1; MG/1
1 TABLET, FILM COATED ORAL 2 TIMES DAILY WITH MEALS
Qty: 20 TABLET | Refills: 0 | Status: SHIPPED | OUTPATIENT
Start: 2022-12-03 | End: 2022-12-13

## 2022-12-03 ASSESSMENT — ENCOUNTER SYMPTOMS
GASTROINTESTINAL NEGATIVE: 1
SINUS PAIN: 1
TROUBLE SWALLOWING: 0
CHEST TIGHTNESS: 0
WHEEZING: 0
EYE PAIN: 0
VOICE CHANGE: 0
EYE REDNESS: 0
COUGH: 1
SHORTNESS OF BREATH: 0
SORE THROAT: 1
EYE ITCHING: 0
RHINORRHEA: 1

## 2022-12-03 NOTE — Clinical Note
Steve Braun was seen and treated in our emergency department on 12/3/2022. He may return to work on 12/06/2022. If you have any questions or concerns, please don't hesitate to call.       James Grullon, APRN - CNP

## 2022-12-04 NOTE — ED PROVIDER NOTES
[phone]       Chief Complaint   Patient presents with    Nasal Congestion    Headache       Nurses Notes reviewed and I agree except as noted in the HPI. HISTORY OF PRESENT ILLNESS   Conrad Khoury is a 41 y.o. male who presents with uri symptoms x 7 days. He has had cough, chest congestion, headache, fever and some diarrhea. Tolerating fluids. Sinus symptoms re most prominent at this time. REVIEW OF SYSTEMS     Review of Systems   Constitutional:  Positive for activity change, appetite change and fatigue. Negative for chills. HENT:  Positive for congestion, postnasal drip, rhinorrhea, sinus pressure, sinus pain and sore throat. Negative for trouble swallowing and voice change. Eyes:  Negative for pain, redness and itching. Respiratory:  Positive for cough. Negative for chest tightness, shortness of breath and wheezing. Cardiovascular:  Negative for chest pain and leg swelling. Gastrointestinal: Negative. Endocrine: Negative for cold intolerance and heat intolerance. Genitourinary: Negative. Musculoskeletal:  Positive for myalgias. Skin: Negative. Allergic/Immunologic: Positive for environmental allergies. Neurological:  Positive for headaches. Negative for dizziness and weakness. Hematological:  Negative for adenopathy. Does not bruise/bleed easily. Psychiatric/Behavioral: Negative. PAST MEDICAL HISTORY         Diagnosis Date    Allergic rhinitis     CMV (cytomegalovirus infection) ([facility] [address]     Fracture, metacarpal 08/23/2019    right fifth     Nose fracture 08/23/2019    bilateral    Prolonged emergence from general anesthesia     T12 compression fracture (HCC)     Thumb fracture 08/23/2019    left thumb       SURGICAL HISTORY     Patient  has a past surgical history that includes Elbow surgery; [place] tooth extraction; fracture surgery (04/25/2016);  Tibia fracture surgery; REMOVE HARDWARE FEMUR (N/A, 4/18/2019); and REMOVE HARDWARE FEMUR (Right, 9/5/2019). CURRENT MEDICATIONS       Discharge Medication List as of 12/3/2022  7:29 PM        CONTINUE these medications which have NOT CHANGED    Details   ibuprofen (ADVIL;MOTRIN) 600 MG tablet Take 1 tablet by mouth 3 times daily as needed for Pain, Disp-30 tablet, R-0Normal      fluticasone (FLONASE) 50 MCG/ACT nasal spray 1 spray by Each Nostril route daily, Disp-16 g, R-0Normal             ALLERGIES     Patient is is allergic to bactrim [sulfamethoxazole-trimethoprim]. FAMILY HISTORY     Patient'sfamily history includes COPD in his father; Colon Cancer in an other family member; High Blood Pressure in his mother; Prostate Cancer in an other family member. SOCIAL HISTORY     Patient  reports that he has never smoked. He has never used smokeless tobacco. He reports that he does not drink alcohol and does not use drugs. PHYSICAL EXAM     ED TRIAGE VITALS  BP: (!) 150/82, Temp: 98 °F (36.7 °C), Heart Rate: 81, Resp: 20, SpO2: 100 %  Physical Exam  Vitals and nursing note reviewed. Constitutional:       Appearance: He is normal weight. He is ill-appearing. HENT:      Head: Normocephalic. Right Ear: Ear canal and external ear normal. Tympanic membrane is injected and erythematous. Left Ear: Ear canal and external ear normal. Tympanic membrane is injected and erythematous. Nose: Mucosal edema, congestion and rhinorrhea present. Rhinorrhea is purulent. Right Sinus: Frontal sinus tenderness present. Left Sinus: Frontal sinus tenderness present. Mouth/Throat:      Lips: Pink. Mouth: Mucous membranes are dry. Pharynx: Pharyngeal swelling and posterior oropharyngeal erythema present. No oropharyngeal exudate. Tonsils: No tonsillar exudate. Eyes:      Conjunctiva/sclera: Conjunctivae normal.   Cardiovascular:      Rate and Rhythm: Regular rhythm. Tachycardia present. Pulses: Normal pulses.       Heart sounds: Normal heart sounds. Pulmonary:      Effort: Pulmonary effort is normal.      Breath sounds: No wheezing, rhonchi or rales. Abdominal:      General: Abdomen is flat. Palpations: Abdomen is soft. Musculoskeletal:         General: Normal range of motion. Cervical back: Normal range of motion and neck supple. Tenderness present. Lymphadenopathy:      Cervical: No cervical adenopathy. Skin:     General: Skin is warm and dry. Capillary Refill: Capillary refill takes less than 2 seconds. Coloration: Skin is not pale. Neurological:      General: No focal deficit present. Mental Status: He is alert and oriented to person, place, and time. Mental status is at baseline. Psychiatric:         Mood and Affect: Mood normal.         Behavior: Behavior normal.         Thought Content: Thought content normal.       DIAGNOSTIC RESULTS   Labs:  Results for orders placed or performed during the hospital encounter of 12/03/22   COVID-19, Rapid   Result Value Ref Range    SARS-CoV-2, HAYLEE NOT  DETECTED NOT DETECTED   Rapid influenza A/B antigens   Result Value Ref Range    Flu A Antigen Negative NEGATIVE    Influenza B Ag, EIA Negative NEGATIVE       IMAGING:  No orders to display     URGENT CARE COURSE:         Medications - No data to display  PROCEDURES:  FINALIMPRESSION    I have reviewed the patient's medical history in detail and updated the computerized patient record. HPI/ROS per the patient and caregiver. Overall non toxic in appearance. Answers questions appropriately. Conditions discussed and addressed this visit include:   Patient appears ill but non-toxic and well hydrated. Patient is to take medications as directed. Continue all home medications. Potential side effects of the medications were reviewed with the patient in detail. The patient can increase fluids. The patient can have Tylenol or Motrin for pain and fever as needed.   Discussed with patient signs and symptoms that would require emergent evaluation and treatment. The patient will follow-up with their family physician or go to the ER if they develop any worsening symptoms. The patient was discharged in stable condition    1.  Acute frontal sinusitis, recurrence not specified        DISPOSITION/PLAN   DISPOSITION Decision To Discharge 12/03/2022 07:35:35 PM    PATIENT REFERRED TO:  R[address]ll [address] [place] [address]  [phone]    In 3 days  As needed, If symptoms worsen  DISCHARGE MEDICATIONS:  Discharge Medication List as of 12/3/2022  7:29 PM        START taking these medications    Details   amoxicillin-clavulanate (AUGMENTIN) 875-125 MG per tablet Take 1 tablet by mouth 2 times daily (with meals) for 10 days, Disp-20 tablet, R-0Normal      methylPREDNISolone (MEDROL, CORAL,) 4 MG tablet Take by mouth., Disp-1 kit, R-0Normal           Discharge Medication List as of 12/3/2022  7:29 PM          KEN Vidal CNP, APRN - CNP  12/05/22 1052

## 2022-12-05 ASSESSMENT — ENCOUNTER SYMPTOMS: SINUS PRESSURE: 1

## 2023-02-22 ENCOUNTER — TELEPHONE (OUTPATIENT)
Dept: FAMILY MEDICINE CLINIC | Age: 48
End: 2023-02-22

## 2023-02-22 NOTE — TELEPHONE ENCOUNTER
Patient called in stating his wife tested positive on 2.14.23 for covid at [facility].   He is asking for a note excusing him from  work for 2.16.23 and return to work date 2.19.2023        Note can be faxed to  [phone] [facility])

## 2024-01-11 ENCOUNTER — HOSPITAL ENCOUNTER (EMERGENCY)
Age: 49
Discharge: HOME | End: 2024-01-11
Payer: COMMERCIAL

## 2024-01-11 VITALS
RESPIRATION RATE: 16 BRPM | HEART RATE: 71 BPM | TEMPERATURE: 97.8 F | DIASTOLIC BLOOD PRESSURE: 90 MMHG | SYSTOLIC BLOOD PRESSURE: 133 MMHG

## 2024-01-11 DIAGNOSIS — J20.9 ACUTE BRONCHITIS, UNSPECIFIED ORGANISM: Primary | ICD-10-CM

## 2024-01-11 PROCEDURE — 99213 OFFICE O/P EST LOW 20 MIN: CPT | Performed by: NURSE PRACTITIONER

## 2024-01-11 PROCEDURE — 99213 OFFICE O/P EST LOW 20 MIN: CPT

## 2024-01-11 RX ORDER — BENZONATATE 100 MG/1
100 CAPSULE ORAL 3 TIMES DAILY PRN
Qty: 20 CAPSULE | Refills: 0 | Status: SHIPPED | OUTPATIENT
Start: 2024-01-11 | End: 2024-01-18

## 2024-01-11 RX ORDER — AZITHROMYCIN 250 MG/1
TABLET, FILM COATED ORAL
Qty: 1 PACKET | Refills: 0 | Status: SHIPPED | OUTPATIENT
Start: 2024-01-11 | End: 2024-01-15

## 2024-01-11 ASSESSMENT — PAIN - FUNCTIONAL ASSESSMENT: PAIN_FUNCTIONAL_ASSESSMENT: NONE - DENIES PAIN

## 2024-01-11 ASSESSMENT — ENCOUNTER SYMPTOMS
WHEEZING: 0
EYE DISCHARGE: 0
SORE THROAT: 0
SHORTNESS OF BREATH: 0
EYE REDNESS: 0
DIARRHEA: 0
RHINORRHEA: 0
VOMITING: 0
TROUBLE SWALLOWING: 0
NAUSEA: 0
COUGH: 1

## 2024-01-11 NOTE — DISCHARGE INSTRUCTIONS
Take medications or antibiotics as prescribed.  Ensure you are getting enough rest and that you are drinking fluids at least every 6 hours.  Symptoms may take up to 10 days to resolve.  Seek medical treatment for symptoms that last >10 days, fever >101.5 for >3 days, inability to keep fluids down, or other worrisome symptoms develop.

## 2024-01-11 NOTE — ED PROVIDER NOTES
[facility] URGENT CARE  Urgent Care Encounter      CHIEF COMPLAINT       Chief Complaint   Patient presents with    Cough     Productive cough, started the day after [place].  Was productive with brown sputum.  Sputum continues, however, it is clear now.       Nurses Notes reviewed and I agree except as noted in the HPI.  HISTORY OF PRESENT ILLNESS   Saman Oneill is a 48 y.o. male who presents with a 3-week history of cough and congestion.  Patient originally had upper respiratory symptoms.  This started before [place].  He developed a productive brown sputum that has since cleared.  He is denying fever.  Patient states that the cough will not resolve.  He has tried over-the-counter DayQuil and decongestant with minimal to no relief.  He is eating and drinking well and denies fever.  No further symptoms noted at this time.    REVIEW OF SYSTEMS     Review of Systems   Constitutional:  Negative for chills, diaphoresis, fatigue and fever.   HENT:  Negative for congestion, ear pain, rhinorrhea, sore throat and trouble swallowing.    Eyes:  Negative for discharge and redness.   Respiratory:  Positive for cough. Negative for shortness of breath and wheezing.    Cardiovascular:  Negative for chest pain.   Gastrointestinal:  Negative for diarrhea, nausea and vomiting.   Genitourinary:  Negative for decreased urine volume.   Musculoskeletal:  Negative for neck pain and neck stiffness.   Skin:  Negative for rash.   Neurological:  Negative for headaches.   Hematological:  Negative for adenopathy.   Psychiatric/Behavioral:  Negative for sleep disturbance.        PAST MEDICAL HISTORY         Diagnosis Date    Allergic rhinitis     CMV (cytomegalovirus infection) ([facility])     Fracture, metacarpal 08/23/2019    right fifth     Nose fracture 08/23/2019    bilateral    Prolonged emergence from general anesthesia     T12 compression fracture ([facility])     Thumb fracture 08/23/2019    left thumb       SURGICAL HISTORY    members. Follow up as an outpatient with PCP within the next 3 days, or sooner if symptoms warrant.     PATIENT REFERRED TO:  Osmani Pederson,   [address] [place] [address] [phone]    In 1 week  If symptoms worsen    DISCHARGE MEDICATIONS:  New Prescriptions    AZITHROMYCIN (ZITHROMAX Z-CORAL) 250 MG TABLET    Take 2 tablets (500 mg) on Day 1, and then take 1 tablet (250 mg) on days 2 through 5.    BENZONATATE (TESSALON) 100 MG CAPSULE    Take 1 capsule by mouth 3 times daily as needed for Cough     Current Discharge Medication List        Please note that some or all of this chart was generated using [employer] voice recognition software. Although every effort was made to ensure the accuracy of this automated transcription, some errors in transcription may have occurred    KEN Shelton CNP, Kendra R, APRN - CNP  01/11/24 [id number]

## 2024-01-11 NOTE — ED TRIAGE NOTES
Patient appears to [facility] with son and wife.  He states he started with a cough on December 26, 2023 and has not went away since.  Sputum initially was brown and is now clear is color.  Denies sore throat or shortness of breath.  Patient states he had a headache for two days prior to today.

## 2025-02-19 ENCOUNTER — APPOINTMENT (OUTPATIENT)
Dept: CT IMAGING | Age: 50
End: 2025-02-19
Payer: COMMERCIAL

## 2025-02-19 ENCOUNTER — APPOINTMENT (OUTPATIENT)
Dept: GENERAL RADIOLOGY | Age: 50
End: 2025-02-19
Payer: COMMERCIAL

## 2025-02-19 ENCOUNTER — HOSPITAL ENCOUNTER (EMERGENCY)
Age: 50
Discharge: HOME | End: 2025-02-19
Payer: COMMERCIAL

## 2025-02-19 VITALS
RESPIRATION RATE: 17 BRPM | BODY MASS INDEX: 27.59 KG/M2 | SYSTOLIC BLOOD PRESSURE: 123 MMHG | DIASTOLIC BLOOD PRESSURE: 93 MMHG | OXYGEN SATURATION: 98 % | HEART RATE: 81 BPM | WEIGHT: 215 LBS | HEIGHT: 74 IN | TEMPERATURE: 98.1 F

## 2025-02-19 DIAGNOSIS — R10.12 LEFT UPPER QUADRANT ABDOMINAL PAIN: Primary | ICD-10-CM

## 2025-02-19 LAB
ALBUMIN SERPL BCG-MCNC: 4.5 G/DL (ref 3.5–5.1)
ALP SERPL-CCNC: 87 U/L (ref 38–126)
ALT SERPL W/O P-5'-P-CCNC: 17 U/L (ref 11–66)
ANION GAP SERPL CALC-SCNC: 10 MEQ/L (ref 8–16)
AST SERPL-CCNC: 15 U/L (ref 5–40)
BASOPHILS ABSOLUTE: 0 THOU/MM3 (ref 0–0.1)
BASOPHILS NFR BLD AUTO: 0.5 %
BILIRUB CONJ SERPL-MCNC: < 0.1 MG/DL (ref 0–0.2)
BILIRUB SERPL-MCNC: 0.4 MG/DL (ref 0.3–1.2)
BUN SERPL-MCNC: 16 MG/DL (ref 7–22)
CALCIUM SERPL-MCNC: 9.1 MG/DL (ref 8.2–9.6)
CHLORIDE SERPL-SCNC: 104 MEQ/L (ref 98–111)
CO2 SERPL-SCNC: 27 MEQ/L (ref 23–33)
CREAT SERPL-MCNC: 1 MG/DL (ref 0.4–1.2)
CRP SERPL-MCNC: 0.49 MG/DL (ref 0–0.5)
DEPRECATED RDW RBC AUTO: 38 FL (ref 35–45)
EKG ATRIAL RATE: 60 BPM
EKG P AXIS: 56 DEGREES
EKG P-R INTERVAL: 160 MS
EKG Q-T INTERVAL: 402 MS
EKG QRS DURATION: 100 MS
EKG QTC CALCULATION (BAZETT): 402 MS
EKG R AXIS: 5 DEGREES
EKG T AXIS: 16 DEGREES
EKG VENTRICULAR RATE: 60 BPM
EOSINOPHIL NFR BLD AUTO: 3.1 %
EOSINOPHILS ABSOLUTE: 0.2 THOU/MM3 (ref 0–0.4)
ERYTHROCYTE [DISTWIDTH] IN BLOOD BY AUTOMATED COUNT: 13 % (ref 11.5–14.5)
GFR SERPL CREATININE-BSD FRML MDRD: > 90 ML/MIN/1.73M2
GLUCOSE SERPL-MCNC: 97 MG/DL (ref 70–108)
HCT VFR BLD AUTO: 44.3 % (ref 42–52)
HGB BLD-MCNC: 15.1 GM/DL (ref 14–18)
IMM GRANULOCYTES # BLD AUTO: 0.02 THOU/MM3 (ref 0–0.07)
IMM GRANULOCYTES NFR BLD AUTO: 0.3 %
LIPASE SERPL-CCNC: 38.3 U/L (ref 5.6–51.3)
LYMPHOCYTES ABSOLUTE: 1.4 THOU/MM3 (ref 1–4.8)
LYMPHOCYTES NFR BLD AUTO: 22.2 %
MAGNESIUM SERPL-MCNC: 2 MG/DL (ref 1.6–2.4)
MCH RBC QN AUTO: 27.8 PG (ref 26–33)
MCHC RBC AUTO-ENTMCNC: 34.1 GM/DL (ref 32.2–35.5)
MCV RBC AUTO: 81.4 FL (ref 80–94)
MONOCYTES ABSOLUTE: 0.6 THOU/MM3 (ref 0.4–1.3)
MONOCYTES NFR BLD AUTO: 9.6 %
NEUTROPHILS ABSOLUTE: 3.9 THOU/MM3 (ref 1.8–7.7)
NEUTROPHILS NFR BLD AUTO: 64.3 %
NRBC BLD AUTO-RTO: 0 /100 WBC
OSMOLALITY SERPL CALC.SUM OF ELEC: 282.4 MOSMOL/KG (ref 275–300)
PLATELET # BLD AUTO: 183 THOU/MM3 (ref 130–400)
PMV BLD AUTO: 10.1 FL (ref 9.4–12.4)
POTASSIUM SERPL-SCNC: 4.7 MEQ/L (ref 3.5–5.2)
PROT SERPL-MCNC: 7.1 G/DL (ref 6.1–8)
RBC # BLD AUTO: 5.44 MILL/MM3 (ref 4.7–6.1)
SODIUM SERPL-SCNC: 141 MEQ/L (ref 135–145)
WBC # BLD AUTO: 6.1 THOU/MM3 (ref 4.8–10.8)

## 2025-02-19 PROCEDURE — 6360000004 HC RX CONTRAST MEDICATION

## 2025-02-19 PROCEDURE — 99285 EMERGENCY DEPT VISIT HI MDM: CPT

## 2025-02-19 PROCEDURE — 71046 X-RAY EXAM CHEST 2 VIEWS: CPT

## 2025-02-19 PROCEDURE — 80053 COMPREHEN METABOLIC PANEL: CPT

## 2025-02-19 PROCEDURE — 74177 CT ABD & PELVIS W/CONTRAST: CPT

## 2025-02-19 PROCEDURE — 83735 ASSAY OF MAGNESIUM: CPT

## 2025-02-19 PROCEDURE — 83690 ASSAY OF LIPASE: CPT

## 2025-02-19 PROCEDURE — 86140 C-REACTIVE PROTEIN: CPT

## 2025-02-19 PROCEDURE — 36415 COLL VENOUS BLD VENIPUNCTURE: CPT

## 2025-02-19 PROCEDURE — 85025 COMPLETE CBC W/AUTO DIFF WBC: CPT

## 2025-02-19 PROCEDURE — 93010 ELECTROCARDIOGRAM REPORT: CPT | Performed by: INTERNAL MEDICINE

## 2025-02-19 PROCEDURE — 82248 BILIRUBIN DIRECT: CPT

## 2025-02-19 PROCEDURE — 93005 ELECTROCARDIOGRAM TRACING: CPT

## 2025-02-19 RX ORDER — NAPROXEN 500 MG/1
500 TABLET ORAL 2 TIMES DAILY PRN
Qty: 60 TABLET | Refills: 0 | Status: SHIPPED | OUTPATIENT
Start: 2025-02-19

## 2025-02-19 RX ORDER — IOPAMIDOL 755 MG/ML
80 INJECTION, SOLUTION INTRAVASCULAR
Status: COMPLETED | OUTPATIENT
Start: 2025-02-19 | End: 2025-02-19

## 2025-02-19 RX ADMIN — IOPAMIDOL 80 ML: 755 INJECTION, SOLUTION INTRAVENOUS at 14:24

## 2025-02-19 ASSESSMENT — PAIN DESCRIPTION - LOCATION: LOCATION: ABDOMEN

## 2025-02-19 ASSESSMENT — PAIN SCALES - GENERAL: PAINLEVEL_OUTOF10: 2

## 2025-02-19 ASSESSMENT — PAIN DESCRIPTION - ORIENTATION: ORIENTATION: LEFT;UPPER

## 2025-02-19 ASSESSMENT — PAIN - FUNCTIONAL ASSESSMENT: PAIN_FUNCTIONAL_ASSESSMENT: 0-10

## 2025-02-19 NOTE — ED PROVIDER NOTES
[facility] EMERGENCY DEPARTMENT      EMERGENCY MEDICINE     Pt Name: Saman Oneill  MRN: [id number]  Birthdate 1975  Date of evaluation: 2/19/2025  Provider: Divine Nguyen PA-C    CHIEF COMPLAINT       Chief Complaint   Patient presents with    Abdominal Pain     LUQ     HISTORY OF PRESENT ILLNESS   Saman Oneill is a pleasant 49 y.o. male who presents to the emergency department from from home, by private vehicle for evaluation of left upper quadrant pain x 5 days..    Patient reports of left upper quadrant/left-sided xiphoid process pain.  He states that the pain is only perpetuated when he sneezes or take deep inspirations.  He describes the pain as stabbing, sharp, nonradiating and local.  Patient denies any injury and trauma to the area recently.  He states he had an episode similar around 5 years ago when he was doing [employer] and his left spleen was injured.  Patient also states that he contracted cytomegalovirus from patient 5 to 8 years ago.  Otherwise patient denies fever chills, nausea vomiting, other abdominal pain, constipation.  He states he had some diffuse diarrhea but he recently traveled out of [facility] and had consumed unusual foods.  Patient states the pain is better when he is sitting up then laying down.    PASTMEDICAL HISTORY     Past Medical History:   Diagnosis Date    Allergic rhinitis     CMV (cytomegalovirus infection) ([facility])     Fracture, metacarpal 08/23/2019    right fifth     Nose fracture 08/23/2019    bilateral    Prolonged emergence from general anesthesia     T12 compression fracture ([facility])     Thumb fracture 08/23/2019    left thumb       Patient Active Problem List   Diagnosis Code    T12 compression fracture ([facility]) S22.080A    Splenomegaly R16.1    Allergic rhinitis J30.9    Superficial phlebitis I80.9    Bloody diarrhea R19.7    Hematochezia K92.1    Painful orthopaedic hardware T84.84XA     SURGICAL HISTORY       Past Surgical History:   Procedure Laterality Date    ELBOW

## 2025-02-19 NOTE — ED TRIAGE NOTES
Pt presents to the ER LUQ abdominal pain x 5 days. Pt denies any other symptoms. Pt took ibuprofen this morning that helped with the pain. Pt is alert, VSS

## 2025-02-19 NOTE — DISCHARGE INSTRUCTIONS
Follow up with your PCP regarding the pain. Your scans and labs were negative during the ED visit. Use naproxen as needed for the pain.     Avoid eating any spicy food, milk type products or drinks that have caffeine in it.  Take all medications as prescribed.  For pain use ibuprofen (Motrin) or acetaminophen (Tylenol), unless prescribed medications that have acetaminophen in it.  You can take over the counter acetaminophen tablets (1 - 2 tablets of the 500-mg strength every 6 hours) or ibuprofen tablets (2 tablets every 4 hours).    PLEASE RETURN TO THE EMERGENCY DEPARTMENT IMMEDIATELY for worsening symptoms, or if you develop any concerning symptoms such as: high fever not relieved by acetaminophen (Tylenol) and/or ibuprofen (Motrin), chills, shortness of breath, chest pain, persistent nausea and/or vomiting, numbness, weakness or tingling in the arms or legs or change in color of the extremities, changes in mental status, persistent headache, blurry vision.    Return within 8 - 12 hours if you have any of the following: worsening of pain in your abdomen, no food sounds good to you, you continue to vomit, pain goes to your back or testicles, have pain in the abdomen when going over a bump in the car or when you jump up and down, inability to urinate, unable to follow up with your physician, or other any other care or concern.

## (undated) DEVICE — GOWN,SIRUS,NONRNF,SETINSLV,XL,20/CS: Brand: MEDLINE

## (undated) DEVICE — PACK PROCEDURE SURG ORTH BASIC SRHP LF

## (undated) DEVICE — DRAPE C ARM W36XL30IN RECTANG BND BG AND TAPE

## (undated) DEVICE — C-ARM: Brand: UNBRANDED

## (undated) DEVICE — CHLORAPREP 26ML ORANGE

## (undated) DEVICE — SUTURE STRATAFIX SPRL SZ 2-0 L9IN ABSRB VLT MH L36MM 1/2 SXPD2B408

## (undated) DEVICE — SUTURE STRATAFIX SPRL SZ 1 L14IN ABSRB VLT L48CM CTX 1/2 SXPD2B405

## (undated) DEVICE — SPONGE LAP W18XL18IN WHT COT 4 PLY FLD STRUNG RADPQ DISP ST

## (undated) DEVICE — SUTURE VCRL SZ 2-0 L27IN ABSRB UD L36MM CP-1 1/2 CIR REV J266H

## (undated) DEVICE — GLOVE ORANGE PI 8   MSG9080

## (undated) DEVICE — BASIC SINGLE BASIN BTC-LF: Brand: MEDLINE INDUSTRIES, INC.

## (undated) DEVICE — GLOVE ORANGE PI 7   MSG9070

## (undated) DEVICE — BANDAGE COMPR E SGL LAYERED CLSR BGE W/ CLP W4INXL15FT

## (undated) DEVICE — C-ARMOR C-ARM EQUIPMENT COVERS CLEAR STERILE UNIVERSAL FIT 12 PER CASE: Brand: C-ARMOR

## (undated) DEVICE — PADDING CAST W6INXL4YD COT LO LINTING WYTEX

## (undated) DEVICE — ROYAL SILK SURGICAL GOWN, XXL: Brand: CONVERTORS

## (undated) DEVICE — SUTURE ETHLN SZ 3-0 L30IN NONABSORBABLE BLK L36MM FSLX 3/8 1673BH

## (undated) DEVICE — 4-PORT MANIFOLD: Brand: NEPTUNE 2

## (undated) DEVICE — COVER ARMBRD W13XL28.5IN IMPERV BLU FOR OP RM

## (undated) DEVICE — AGENT HEMSTAT W4XL8IN OXIDIZED REGENERATED CELOS ABSRB

## (undated) DEVICE — POSITIONER HD W8XH4XL8.5IN RASPBERRY FOAM SLT

## (undated) DEVICE — GLOVE ORANGE PI 7 1/2   MSG9075

## (undated) DEVICE — GOWN,SIRUS,NON REINFRCD,LARGE,SET IN SL: Brand: MEDLINE

## (undated) DEVICE — GUIDE PIN 3.2MM X 343MM: Brand: TRIGEN

## (undated) DEVICE — 450 ML BOTTLE OF 0.05% CHLORHEXIDINE GLUCONATE IN 99.95% STERILE WATER FOR IRRIGATION, USP AND APPLICATOR.: Brand: IRRISEPT ANTIMICROBIAL WOUND LAVAGE

## (undated) DEVICE — SYSTEM SKIN CLSR 22CM DERMBND PRINEO

## (undated) DEVICE — GLOVE SURG SZ 65 THK91MIL LTX FREE SYN POLYISOPRENE

## (undated) DEVICE — SOLUTION IV 1000ML LAC RINGERS PH 6.5 INJ USP VIAFLX PLAS

## (undated) DEVICE — GLOVE ORANGE PI 8 1/2   MSG9085

## (undated) DEVICE — COVER,LIGHT HANDLE,FLX,2/PK: Brand: MEDLINE INDUSTRIES, INC.

## (undated) DEVICE — TETRA-FLEX CF WOVEN LATEX FREE ELASTIC BANDAGE 6" X 5.5 YD: Brand: TETRA-FLEX™CF